# Patient Record
Sex: MALE | Race: OTHER | HISPANIC OR LATINO | Employment: FULL TIME | URBAN - METROPOLITAN AREA
[De-identification: names, ages, dates, MRNs, and addresses within clinical notes are randomized per-mention and may not be internally consistent; named-entity substitution may affect disease eponyms.]

---

## 2017-01-20 ENCOUNTER — GENERIC CONVERSION - ENCOUNTER (OUTPATIENT)
Dept: OTHER | Facility: OTHER | Age: 32
End: 2017-01-20

## 2018-01-12 NOTE — PROGRESS NOTES
Assessment    1  Obesity (278 00) (E66 9)   2  Acanthosis nigricans (701 2) (L83)   3  Encounter for preventive health examination (V70 0) (Z00 00)    Plan  Acanthosis nigricans, Obesity    · (1) COMPREHENSIVE METABOLIC PANEL; Status:Active; Requested for:05Tob2735;    · (1) LIPID PANEL, FASTING; Status:Active; Requested for:34Nwc7067;    · (1) TSH WITH FT4 REFLEX; Status:Active; Requested for:87Kjd0378;    · (1) VITAMIN D 25-HYDROXY; Status:Active; Requested for:01Cle1994;   Obesity    · Begin a limited exercise program ; Status:Complete;   Done: 41ZFB2632   · Begin or continue regular aerobic exercise  Gradually work up to at least 3 sessions of 30  minutes of exercise a week ; Status:Complete;   Done: 53EPE3644   · Diets that are low in carbohydrates and high in protein are very popular for weight loss ;  Status:Complete;   Done: 18WWJ4367   · Drink at least 6 glasses of clear liquids a day ; Status:Complete;   Done: 59REV3452   · Eat a low fat and low cholesterol diet ; Status:Complete;   Done: 00NAD2774   · Keep a diary of when and what you eat ; Status:Complete;   Done: 22XSF9152   · Some eating tips that can help you lose weight ; Status:Complete;   Done: 18WTO4636   · Stretch and warm up your muscles during the first 10 minutes , then cool down your  muscles for the last 10 minutes of exercise ; Status:Complete;   Done: 38DHI3667   · We encourage all of our patients to exercise regularly  30 minutes of exercise or physical  activity five or more days a week is recommended for children and adults ;  Status:Complete;   Done: 79RPH4150   · We recommend that you change your eating habits slowly ; Status:Complete;   Done:  07HNX3778   · We recommend that you follow the "Mediterranean diet "; Status:Complete;   Done:  30XRK4964   · We recommend you modify your diet to achieve and maintain a healthy weight    Being  overweight may increase your risk for developing health problems such as diabetes,  heart disease, and cancer  Avoid high fat foods and eat a balanced diet rich  in fruits and vegetables  The combination of a reduced-calorie diet and increased  physical activity is recommended  Please let us know if you would like to  learn more about your nutrition and calorie needs, and additional options including  weight loss programs that can help you achieve your goals ; Status:Complete;   Done:  41ZZR2964   · Follow-up visit in 1 month Evaluation and Treatment  Follow-up  Status: Hold For -  Scheduling  Requested for: 68XLP9809    Discussion/Summary  Impression: health maintenance visit  Currently, he eats a poor diet and has an inadequate exercise regimen  Prostate cancer screening: PSA is not indicated  Testicular cancer screening: the risks and benefits of testicular cancer screening were discussed, self testicular exam technique was taught and monthly self testicular exam was advised  Colorectal cancer screening: colorectal cancer screening is not indicated  Screening lab work includes glucose, lipid profile and 25-hydroxyvitamin D  The risks and benefits of immunizations were discussed and needs PPD/fluzone  He was advised to be evaluated by an optometrist and a dentist  Advice and education were given regarding nutrition, aerobic exercise, weight bearing exercise, weight loss, calcium supplements, vitamin D supplements, reproductive health, cardiovascular risk reduction, sunscreen use and seat belt use  Patient discussion: discussed with the patient  Obesity and sedentary lifestyle; drinks half a gallon of 2% milk a day  Workup for diabetes/ metabolic syndrome necessary as patient has skin changes acanthosis nigricans; and history of athletes feet  He will return in one month after labs done  We will discuss sleep apnea workup as well as lifestyle management of obesity/ metabolic syndrome  Urged patient to get up and walk around every 30 minutes at work    Urged patient to decrease milk intake to 1 glass a day with 1%  Obtain labs and return to me in one month  Declining Fluzone but may re-consider encourage patient to get flu shot in December 2014 chest xray reviewed w/ pt as per mother's request - no CVA tenderness/ needs further radiologic testing  address at next visit  PPD needs to be read in 48-72 hours  The patient has the current Goals: 5 lb weight loss in one month  exercise 30 mins 3 x a week  get up and  every 30 mins at work  The patent has the current Barriers:   Patient is able to Self-Care  Patient agrees and allows to involve family/caregiver in development of care plan:   Possible side effects of new medications were reviewed with the patient/guardian today  The treatment plan was reviewed with the patient/guardian  The patient/guardian understands and agrees with the treatment plan   The patient was counseled regarding diagnostic results, instructions for management, risk factor reductions, prognosis, patient and family education, impressions, risks and benefits of treatment options, importance of compliance with treatment  Self Referrals: No      Chief Complaint  pt here for a health maintenance visit  no issues  pt needs physical for work completed   depression scale negative  pt declined fllu vaccine today      History of Present Illness  HM, Adult Male: The patient is being seen for a health maintenance evaluation  Social History: Household members include domestic partner  He is unmarried  Work status: working full time and occupation: works w/ disabled  General Health: The patient's health since the last visit is described as good  He does not have regular dental visits  He denies vision problems  He denies hearing loss  Immunizations status: not up to date  Lifestyle:  He does not have a healthy diet  He has weight concerns  He does not exercise regularly  He does not use tobacco  He consumes alcohol  He reports never drinking alcohol  He denies drug use  Reproductive health:  the patient is sexually active  Screening: cancer screening reviewed and updated  metabolic screening reviewed and current  HPI: Here for health maintence - no recent labs  Needs work form filled out  Need PPD done  12 point ROS done - no complaints except snoring and gasping of air at night - better since sleeps reclined  + weight gain x 9 years - used to be 180 lbs  + sports when he was in high school   Met his sign other - had 3 children - started gaining weight  Drinks 1/2 gallon of 2% milk a day  no regular exercise  At present job, role is sedentary  Mom states had abn xray in past - pt states was told not to worrry about it - chest xray reading - upper abdomen calcifications - no further work up  no follow through w/ labs  no follow through w/ sleep study  Review of Systems    Constitutional: No fever or chills, feels well, no tiredness, no recent weight gain or weight loss  Eyes: No complaints of eye pain, no red eyes, no discharge from eyes, no itchy eyes  ENT: no complaints of earache, no hearing loss, no nosebleeds, no nasal discharge, no sore throat, no hoarseness  Cardiovascular: No complaints of slow heart rate, no fast heart rate, no chest pain, no palpitations, no leg claudication, no lower extremity  Respiratory: snore, but No complaints of shortness of breath, no wheezing, no cough, no SOB on exertion, no orthopnea or PND and as noted in HPI  Gastrointestinal: No complaints of abdominal pain, no constipation, no nausea or vomiting, no diarrhea or bloody stools  Genitourinary: No complaints of dysuria, no incontinence, no hesitancy, no nocturia, no genital lesion, no testicular pain  Musculoskeletal: No complaints of arthralgia, no myalgias, no joint swelling or stiffness, no limb pain or swelling  Integumentary: No complaints of skin rash or skin lesions, no itching, no skin wound, no dry skin     Neurological: No compliants of headache, no confusion, no convulsions, no numbness or tingling, no dizziness or fainting, no limb weakness, no difficulty walking  Psychiatric: Is not suicidal, no sleep disturbances, no anxiety or depression, no change in personality, no emotional problems  Endocrine: No complaints of proptosis, no hot flashes, no muscle weakness, no erectile dysfunction, no deepening of the voice, no feelings of weakness  Hematologic/Lymphatic: No complaints of swollen glands, no swollen glands in the neck, does not bleed easily, no easy bruising  Active Problems    1  Abnormal finding on chest xray (793 2) (R93 8)   2  Acanthosis nigricans (701 2) (L83)   3  Acute bronchitis (466 0) (J20 9)   4  Cough (786 2) (R05)   5  Fatigue (780 79) (R53 83)   6  Obesity (278 00) (E66 9)   7  Obstructive sleep apnea (327 23) (G47 33)   8  Pain in joint of right shoulder region (719 41) (M25 511)   9  Somatic dysfunction of cervical region (739 1) (M99 01)   10  Somatic Dysfunction Of Upper Extremities (739 7)   11  Tinea cruris (110 3) (B35 6)   12   Urinary frequency (788 41) (R35 0)    Past Medical History    · History of backache (V13 59) (Z87 39)   · History of headache (V13 89) (Z87 898)    Surgical History    · History of Prior Surgical Procedure Not Done   · Denied: History of Recent Surgery    Family History  Mother    · Family history of asthma (V17 5) (Z82 5)   · Family history of hypertension (V17 49) (Z82 49)   · Family history of thyroid disease (V18 19) (Z80 46)  Father    · Family history of hyperlipidemia (V18 19) (Z83 49)   · Family history of hypertension (V17 49) (Z82 49)  Maternal Grandmother    · Family history of Type 2 Diabetes Mellitus  Grandfather    · Family history of cerebrovascular accident (CVA) (V17 1) (Z82 3)    Social History    · Always uses seat belt   · Does not drink alcohol (V49 89) (Z78 9)   · Drug Use (305 90)   · marijuana   · Full-time employment   · Never A Smoker   · No caffeine use   · No illicit drug use   · Non-smoker (V49 89) (Z78 9)   · Adventist    Current Meds   1  No Reported Medications Recorded    Allergies    1  No Known Drug Allergies    Vitals   Recorded: [id number] 02:08PM   Temperature 98 1 F   Heart Rate 98   Systolic 112   Diastolic 80   Height 5 ft 11 in   Weight 306 lb 6 08 oz   BMI Calculated 42 73   BSA Calculated 2 52   O2 Saturation 98   Pain Scale 0     Physical Exam    Constitutional   General appearance: No acute distress, well appearing and well nourished  Eyes   Conjunctiva and lids: No erythema, swelling or discharge  Pupils and irises: Equal, round, reactive to light  Ophthalmoscopic examination: Normal fundi and optic discs  Ears, Nose, Mouth, and Throat   External inspection of ears and nose: Normal     Otoscopic examination: Tympanic membranes translucent with normal light reflex  Canals patent without erythema  Hearing: Normal     Nasal mucosa, septum, and turbinates: Normal without edema or erythema  Lips, teeth, and gums: Normal, good dentition  Oropharynx: Normal with no erythema, edema, exudate or lesions  Neck   Neck: Supple, symmetric, trachea midline, no masses  Thyroid: Normal, no thyromegaly  Pulmonary   Respiratory effort: No increased work of breathing or signs of respiratory distress  Percussion of chest: Normal     Palpation of chest: Normal     Auscultation of lungs: Clear to auscultation  Cardiovascular   Palpation of heart: Normal PMI, no thrills  Auscultation of heart: Normal rate and rhythm, normal S1 and S2, no murmurs  Examination of extremities for edema and/or varicosities: Normal     Abdomen   Abdomen: Non-tender, no masses  Liver and spleen: No hepatomegaly or splenomegaly  Lymphatic   Palpation of lymph nodes in neck: No lymphadenopathy  Palpation of lymph nodes in axillae: No lymphadenopathy      Musculoskeletal   Gait and station: Normal     Inspection/palpation of digits and nails: Normal without clubbing or cyanosis  Stability: Normal     Muscle strength/tone: Normal     Skin   Skin and subcutaneous tissue: Normal without rashes or lesions  acanthosis nigrans of neck  Palpation of skin and subcutaneous tissue: Normal turgor  Neurologic   Cranial nerves: Cranial nerves 2-12 intact  Reflexes: 2+ and symmetric  Sensation: No sensory loss  Psychiatric   Judgment and insight: Normal     Orientation to person, place and time: Normal     Recent and remote memory: Intact  Mood and affect: Normal        Results/Data  PHQ-2 Adult Depression Screening [id number] 02:11PM User, s     Test Name Result Flag Reference   PHQ-2 Adult Depression Score 0     Over the last two weeks, how often have you been bothered by any of the following problems? Little interest or pleasure in doing things: Not at all - 0  Feeling down, depressed, or hopeless: Not at all - 0   PHQ-2 Adult Depression Screening Negative       * XR Ribs w/ PA Chest (unilateral) [id number] 03:44PM Alejandro Sender     Test Name Result Flag Reference   XR Ribs w/ PA Chest (Report)     [facility];[address] [facility];;[employer] [place];[address]  05/08/2014 1548  05/08/2014 1551  6 VIEWS    LEFT RIBS AND CHEST    INDICATION- Left rib pain  COMPARISON- June 11, 2012    VIEWS- Frontal chest and 3 views left hemithorax& 4 images^ 4 images    FINDINGS-    The cardiomediastinal silhouette is unremarkable  Lungs are clear  No pleural effusions  There is no pneumothorax  No rib fractures are identified  Calcifications in the left upper abdomen of unknown etiology, possibly  calcified nodes? IMPRESSION-    1  No active pulmonary disease  2  No evidence of rib fractures  3  Calcifications in the left upper abdomen of unknown etiology,  possibly calcified nodes? Followup computed tomography recommended if  there is left flank pain        Transcribed on- [id number]    [address], RAD DO  Reading Radiologist- MARTA Jones DO  Releasing Radiologist- MARTA Jones DO  Released Date Time- 05/08/14 1647  ------------------------------------------------------------------------------  9724^COLTON WALSH  9724^COLTON WALSH       Attending Note  Collaborating Physician Note: Collaborating Physician: I agree with the Advanced Practitioner note        Future Appointments    Date/Time Provider Specialty Site   01/20/2017 01:00 PM Janeth Rebolledov 55   12/21/2016 03:00 PM Marley Fulton, Nurse Schedule  [facility]     Signatures   Electronically signed by : Matilda Velasquez; Dec 19 2016  3:08PM EST                       (Author)    Electronically signed by : GOKUL Zhang ; Dec 19 2016  3:32PM EST                       (Author)

## 2018-01-16 NOTE — PROGRESS NOTES
Chief Complaint  Patient here for PPD read  Negative      Active Problems    1  Abnormal finding on chest xray (793 2) (R93 8)   2  Acanthosis nigricans (701 2) (L83)   3  Acute bronchitis (466 0) (J20 9)   4  Cough (786 2) (R05)   5  Fatigue (780 79) (R53 83)   6  Influenza vaccine needed (V04 81) (Z23)   7  Obesity (278 00) (E66 9)   8  Obstructive sleep apnea (327 23) (G47 33)   9  Pain in joint of right shoulder region (719 41) (M25 511)   10  PPD screening test (V74 1) (Z11 1)   11  Somatic dysfunction of cervical region (739 1) (M99 01)   12  Somatic Dysfunction Of Upper Extremities (739 7)   13  Tinea cruris (110 3) (B35 6)   14  Urinary frequency (788 41) (R35 0)    Current Meds   1  No Reported Medications Recorded    Allergies    1  No Known Drug Allergies    Assessment    1   PPD screening test (V74 1) (Z11 1)    Future Appointments    Date/Time Provider Specialty Site   01/20/2017 01:00 PM [address] [facility]     Signatures   Electronically signed by : GOKUL Toussaint ; Dec 22 2016  8:49AM EST                       (Author)

## 2018-01-16 NOTE — MISCELLANEOUS
Signatures   Electronically signed by : Roxy Basurto; Jan 20 2017  1:43PM EST                       (Author)

## 2018-05-13 ENCOUNTER — HOSPITAL ENCOUNTER (EMERGENCY)
Facility: HOSPITAL | Age: 33
Discharge: HOME | End: 2018-05-13
Attending: EMERGENCY MEDICINE
Payer: COMMERCIAL

## 2018-05-13 ENCOUNTER — APPOINTMENT (EMERGENCY)
Dept: RADIOLOGY | Facility: HOSPITAL | Age: 33
End: 2018-05-13
Payer: COMMERCIAL

## 2018-05-13 VITALS
SYSTOLIC BLOOD PRESSURE: 163 MMHG | BODY MASS INDEX: 41.84 KG/M2 | WEIGHT: 300 LBS | RESPIRATION RATE: 20 BRPM | TEMPERATURE: 98.9 F | OXYGEN SATURATION: 99 % | HEART RATE: 81 BPM | DIASTOLIC BLOOD PRESSURE: 96 MMHG

## 2018-05-13 DIAGNOSIS — S92.353A FRACTURE OF 5TH METATARSAL: Primary | ICD-10-CM

## 2018-05-13 PROCEDURE — 99283 EMERGENCY DEPT VISIT LOW MDM: CPT

## 2018-05-13 PROCEDURE — 73630 X-RAY EXAM OF FOOT: CPT

## 2018-05-13 RX ORDER — OXYCODONE HYDROCHLORIDE AND ACETAMINOPHEN 5; 325 MG/1; MG/1
1 TABLET ORAL EVERY 4 HOURS PRN
Qty: 10 TABLET | Refills: 0 | Status: SHIPPED | OUTPATIENT
Start: 2018-05-13 | End: 2018-05-20

## 2018-05-14 NOTE — ED ATTENDING ATTESTATION
Zeb Samuel MD, saw and evaluated the patient  I have discussed the patient with the resident/non-physician practitioner and agree with the resident's/non-physician practitioner's findings, Plan of Care, and MDM as documented in the resident's/non-physician practitioner's note, except where noted  All available labs and Radiology studies were reviewed  At this point I agree with the current assessment done in the Emergency Department  I have conducted an independent evaluation of this patient including a focused history and a physical exam       60-year-old male presenting to the emergency department for evaluation of right lateral foot pain after an inversion injury  On examination the patient is tender over the 5th metatarsal and has moderate swelling over the 5th metatarsal   Distal neurovascular functions intact  X-ray demonstrates Cui fracture  Plan is splint and crutches with Orthopedics follow-up

## 2018-05-14 NOTE — DISCHARGE INSTRUCTIONS
Fractura de pie en adultos   LO QUE NECESITA SABER:   ¿Qué es osvaldo fractura de pie? Se produce osvaldo fractura en el pie cuando toby o más de los huesos del pie se [place]  Las fracturas de pie por lo general son causadas por traumas, caídas, lesiones por fatiga de osvaldo acción repetitiva  ¿Cuáles son los diferentes tipos de fracturas del pie? · Sin desplazamiento:  El hueso se agrieta o se quiebra daniele permanece en elizabeth lugar  · Desplazamiento:  El hueso se quiebra en 2 partes  · Conminuta:  El hueso se quiebra en Jyl Maged  · Fractura abierta:  El hueso fracturado se sale fuera de la piel  ¿Cuáles son los signos y síntomas de osvaldo fractura de pie? · Sensibilidad en el área lesionada    · Dolor en el pie que empeora cuando intenta pararse o caminar    · Adormecimiento en el pie o los dedos del pie    · Se escucha un crujido cuando mueve el pie    · Inflamación, moretones, ampollas o heridas abiertas en la piel del pie lesionado    · Disminución en la capacidad de  el pie o de caminar    · Cambio en la forma del pie  ¿Cómo se diagnostica osvaldo fractura en el pie? Elizabeth médico le examinará el pie  Es posible que le toque el pie para comprobar si urias perdido la sensación  Además buscará lesiones abiertas en la piel  Podría comprobar si puede  el pie  Es posible que usted necesite hacerse alguno de los siguientes estudios:  · Radiografía:  Se trata de osvaldo imagen que se joann del pie para comprobar si algún hueso está fracturado  Es posible que deba cargar peso en elizabeth pie lesionado para diane la radiografía  · Tomografía computarizada:  Adrianne examen también se conoce guanakito escán TAC  Janette Listen de rosamaria x [place] osvaldo computadora para diane imágenes de elizabeth pie  Estas imágenes pueden mostrar huesos rotos u otras lesiones al pie  Es posible que le administren un tinte de contraste antes de diane las imágenes para que los médicos las puedan tyree con más claridad   Dígale al médico si usted alguna vez ha tenido osvaldo reacción alérgica al tinte de [place]  · Imágenes por resonancia magnética (IRM):  Para realizar arcadio estudio se utilizan imanes potentes y Bernabe computadora que joann imágenes de elizabeth pie  Las imágenes podrían mostrar si tiene osvaldo fractura u otra lesión en el pie  Le podrían administrar un tinte para ayudar a que las imágenes se vean mejor  Dígale al médico si usted alguna vez ha tenido osvaldo reacción alérgica al tinte de [place]  No entre a la fred donde se realiza la resonancia magnética con algo de metal  El metal puede causar lesiones serias  Dígale al médico si usted tiene algo de metal por dentro o sobre elizabeth cuerpo  · Escán óseo:  Se administra osvaldo pequeña y griffiths cantidad de un medio de contraste radioactivo por vía intravenosa  Se santos imágenes de los huesos del pie para tyree si hay alguna fractura  ¿Cómo se trata osvaldo fractura del pie? El tratamiento va a depender del tipo de fractura que usted tenga, y elizabeth severidad  Es posible que usted necesite alguno de los siguientes:  · [place], [place] o férula:  Es posible que le pongan osvaldo bota, un yeso o osvaldo férula para limitar el movimiento de la parte inferior de la pierna y el pie  Estos dispositivos mantienen los [employer] elizabeth lugar, alivian el dolor y evitan que el pie se dañe [facility]  · Medicamento:      ¨ Analgésicos:  Es posible que le receten un medicamento para aliviar el dolor  No espere hasta que el dolor sea severo antes de diane arcadio medicamento  ¨ Antibióticos:  Arcadio medicamento se administra para ayudar a tratar o prevenir osvaldo infección causada por bacteria  ¨ Donata Kanner antitetánica:  Jacelyn Fill se aplica para prevenir que contraiga el tétano  Es posible que necesite recibirla si se le abrió la piel guanakito resultado de la lesión  Debe darse la vacuna antitetánica si no se la ha dado en los últimos 5 a 10 años  · Cirugía:  Podría necesitar osvaldo cirugía si la fractura de elizabeth pie es grave o no se waylon con otros tratamientos   Si usted tiene Onita Brooms, podría necesitar desbridamiento antes de elizabeth cirugía  El desbridamiento es cuando elizabeth médico remueve tejido dañado e infectado, y limpia la herida  Se hace para evitar que contraiga osvaldo infección y que sane mejor  Es posible que usted necesite alguno de los siguientes:    ¨ Fijación externa:  Elizabeth médico colocará tornillos en los huesos fracturados a través de la piel  Los tornillos se fijan a un dispositivo que se encuentra fuera del pie  La fijación externa mantiene los [place], de modo que se puedan sanar  ¨ Reducción abierta y fijación interna:  Hung aziland, elizabeth médico le hará osvaldo incisión en el pie para enderezar los huesos fracturados  Es posible que use alambres, tornillos y placas de metal o clavos para mantener unidas las partes de los huesos fracturados  ¨ Fijación con clavo:  Es posible que elizabeth médico deba usar clavos de alambre de metal para enderezar los huesos fracturados del pie  Los [employer] unidas las partes del hueso fracturado  Colocarán los clavos a través de la piel y usarán un pequeño taladro para ponerlos en el hueso  · Tracción:  La tracción racheal los huesos para colocarlos nuevamente en elizabeth lugar  Se podría poner un clavo en el hueso o en el yeso y sujetarlo al dispositivo de tracción  Se cuelgan pesas del dispositivo de tracción para ayudar a poner los [place]'s posición correcta  ¿Cuáles son los riesgos de osvaldo fractura del pie? · Los nervios, tejidos y vasos sanguíneos de elizabeth pie se podrían dañar hung la [place]  Podría tener entumecimiento o debilidad en el pie y los dedos del pie  Es posible que elizabeth pie no sane rosey o que no quede igual que antes de la lesión  Los tornillos o [employer] se usaron hung la cirugía se podrían soltar y podría ser necesario hacer otra cirugía  Usted podría contraer osvaldo infección  Se le podría formar un coágulo sanguíneo en la pierna   El coágulo podría desprenderse y viajar a elizabeth corazón o cerebro y crear problemas de peligro mortal, guanakito un ataque al corazón o un derrame cerebral     · Si no recibe tratamiento, puede que la fractura de elizabeth pie no se sane  Si la fractura se waylon sharla, elizabeth pie podría quedar deformado  Es posible que no pueda  el pie tan rosey guanakito antes de la lesión  Podría sentir dolor, debilidad o pérdida de la sensación en el pie  Podría correr [employer] de que se le formen coágulos de [place]  Se le podrían dañar los tejidos y contraer osvaldo infección  Si la infección es grave, se le podría infectar el hueso y podría resultar necesario amputarle el pie  ¿Qué puedo hacer para ayudar a que mi pie se cure? · Descanse:  Es posible que deba descansar el pie y evitar las actividades que le provocan dolor  [address] por estrés, usted tendrá que evitar la actividad que causó la fractura hasta que logre sanar  · Hielo:  El hielo ayuda a disminuir la inflamación y el dolor  El hielo también puede contribuir a evitar el daño de los tejidos  Use osvaldo bolsa con hielo o ponga hielo triturado en osvaldo bolsa de plástico  Sapna Bunkers el hielo con Adelene Christine y colóquelo sobre elizabeth pie hung 15 a 20 minutos cada hora o guanakito le indiquen  · Eleve el pie:  Levante el pie por encima del nivel de elizabeth corazón tan a menudo guanakito pueda  [place] va a disminuir inflamación y el dolor  [place] elizabeth pie sobre almohadas o mantas para mantenerlo elevado cómodamente  · Fisioterapia:  Osvaldo vez que haya sanado, un fisioterapeuta podrá enseñarle ejercicios para fortalecer el pie, aumentar el movimiento y aliviar el dolor  ¿Cuándo evelio comunicarme con mi médico?   · Usted tiene fiebre  · Le salen nuevas llagas alrededor de la bota, el yeso o la [place]  · Le surge dificultad o tiene mayor dificultad para  el pie  · Nota que sale mal olor de debajo del yeso  · Se le daña la bota, el yeso o la férula  · Tiene alguna pregunta acerca de elizabeth condición o cuidado    ¿Cuándo evelio buscar atención inmediata o llamar al 911? · El dolor en deshpande pie lesionado empeora, aún después de descansar y diane el analgésico para el dolor    · La piel o los dedos del pie se le adormecen, están inflamados, fríos, blancos o azules  · Usted tiene más dolor o inflamación que antes de que le pusieran el yeso  · La herida drena líquido o pus  · La jeevan empapa el vendaje  · Deshpande pierna se siente cálida, sensible y [place]  Se podría tyree inflamado y hoff  · De repente se siente mareado y sin aire  · Le duele el pecho cuando respira hondo o tose  Es posible que [employer]  ACUERDOS SOBRE DESHPANDE CUIDADO:   Usted tiene el derecho de ayudar a planear deshpande cuidado  Aprenda todo lo que pueda sobre deshpande condición y guanakito darle tratamiento  Discuta andres opciones de tratamiento con andres médicos para decidir el cuidado que usted desea recibir  Usted siempre tiene el derecho de rechazar el tratamiento  Esta información es sólo para uso en educación  Deshpande intención no es darle un consejo médico sobre enfermedades o tratamientos  Colsulte con deshpande [employer] farmacéutico antes de seguir cualquier régimen médico para saber si es seguro y efectivo para usted  © 2017 [address] Information is for End User's use only and may not be sold, redistributed or otherwise used for commercial purposes  All illustrations and images included in CareNotes® are the copyrighted property of A D A M , Inc  or Kalen Mcgee

## 2018-05-14 NOTE — ED PROVIDER NOTES
History  Chief Complaint   Patient presents with    Foot Injury     right foot pain after stepping down onto foot- heard a pop noise and then felt pain     This is a 51-year-old male that presents today with a foot injury  Patient states he was at his girlfriend's house when he was walking on the porch and had an inversion injury  He states he felt a pop and since then he has been having difficulty with ambulation of his right foot  Pain localized to the mid foot 5th metatarsal area where there is swelling  He states no prior injuries before  Denies any numbness or tingling  No weakness of that foot  No ankle pain  No tib-fib or knee pain  Denies any chest pain shortness of breath abdominal pain  28 year male with a foot injury  Patient does have some swelling of the 5th metatarsal area  Will get x-ray and treat accordingly            None       History reviewed  No pertinent past medical history  History reviewed  No pertinent surgical history  History reviewed  No pertinent family history  I have reviewed and agree with the history as documented  Social History   Substance Use Topics    Smoking status: Never Smoker    Smokeless tobacco: Never Used    Alcohol use No        Review of Systems   Constitutional: Negative  Negative for diaphoresis and fever  HENT: Negative  Respiratory: Negative  Negative for cough, shortness of breath and wheezing  Cardiovascular: Negative  Negative for chest pain, palpitations and leg swelling  Gastrointestinal: Negative for abdominal distention, abdominal pain, nausea and vomiting  Genitourinary: Negative  Musculoskeletal: Negative  Foot pain   Skin: Negative  Neurological: Negative  Psychiatric/Behavioral: Negative  All other systems reviewed and are negative        Physical Exam  ED Triage Vitals [05/13/18 2119]   Temperature Pulse Respirations Blood Pressure SpO2   98 9 °F (37 2 °C) 81 20 163/96 99 %      Temp src Heart Rate Source Patient Position - Orthostatic VS BP Location FiO2 (%)   -- -- -- -- --      Pain Score       Worst Possible Pain           Orthostatic Vital Signs  Vitals:    05/13/18 2119   BP: 163/96   Pulse: 81       Physical Exam   Constitutional: He is oriented to person, place, and time  He appears well-developed and well-nourished  No distress  HENT:   Head: Normocephalic and atraumatic  Nose: Nose normal    Mouth/Throat: Oropharynx is clear and moist    Eyes: Conjunctivae and EOM are normal  Pupils are equal, round, and reactive to light  Neck: Normal range of motion  Neck supple  Cardiovascular: Normal rate, regular rhythm and normal heart sounds  No murmur heard  Pulmonary/Chest: Effort normal and breath sounds normal  No respiratory distress  He has no wheezes  He has no rales  Abdominal: Soft  Bowel sounds are normal  He exhibits no distension  There is no tenderness  There is no rebound and no guarding  Musculoskeletal: Normal range of motion  He exhibits edema and tenderness  He exhibits no deformity  Feet:    Swelling localized to the 5th metatarsal with some tenderness  No bruising  Normal dorsalis pedis pulses  No tenderness to the ankle  No tenderness  Patient able to dorsiflex and plantar flex  Normal range of motion of the ankle   Neurological: He is alert and oriented to person, place, and time  No cranial nerve deficit  Skin: Skin is warm and dry  No rash noted  He is not diaphoretic  No pallor  Psychiatric: He has a normal mood and affect  Vitals reviewed  ED Medications  Medications - No data to display    Diagnostic Studies  Results Reviewed     None                 XR foot 3+ views RIGHT   ED Interpretation by Heath Ng MD (05/13 2143)   Fracture of 5th metatarsal            Procedures  Orthopedic Injury  Date/Time: 5/13/2018 10:03 PM  Performed by: Andie Kingsley  Authorized by: Virgil Merino   Consent: Verbal consent obtained    Consent given by: patient  Patient identity confirmed: verbally with patient  Injury location: foot  Location details: right foot  Injury type: fracture  Fracture type: fifth metatarsal  Pre-procedure neurovascular assessment: neurovascularly intact  Pre-procedure distal perfusion: normal  Pre-procedure neurological function: normal  Pre-procedure range of motion: normal  Manipulation performed: no  Immobilization: splint  Splint type: short leg  Supplies used: Ortho-Glass  Post-procedure neurovascular assessment: post-procedure neurovascularly intact  Post-procedure distal perfusion: normal  Post-procedure neurological function: normal  Post-procedure range of motion: normal  Patient tolerance: Patient tolerated the procedure well with no immediate complications            Phone Consults  ED Phone Contact    ED Course                               MDM  Number of Diagnoses or Management Options  right Fracture of 5th metatarsal:   Diagnosis management comments: Patient has a 5th metatarsal injury  Splinted advised to follow up with Orthopedics  Will prescribe Percocet  I did discuss with patient regarding when taking Percocet do not operate any heavy machinery  Advised to return if any worsening swelling worsening pain  CritCare Time    Disposition  Final diagnoses:   right Fracture of 5th metatarsal     Time reflects when diagnosis was documented in both MDM as applicable and the Disposition within this note     Time User Action Codes Description Comment    5/13/2018  9:57 PM Yael, [address] Fracture of 5th metatarsal     5/13/2018  9:57 PM Yael, [address] right Fracture of 5th metatarsal       ED Disposition     ED Disposition Condition Comment    Discharge  [facility] discharge to home/self care      Condition at discharge: Good        Follow-up Information     Follow up With Specialties Details Why Contact Lana Vance MD Orthopedic Surgery Schedule an appointment as soon as possible for a visit  [address] [place] [address]  [phone]          Discharge Medication List as of 5/13/2018  9:59 PM      START taking these medications    Details   oxyCODONE-acetaminophen (PERCOCET) 5-325 mg per tablet Take 1 tablet by mouth every 4 (four) hours as needed for moderate pain for up to 10 days Max Daily Amount: 6 tablets, Starting Sun 5/13/2018, Until Wed 5/23/2018, Print           No discharge procedures on file  ED Provider  Attending physically available and evaluated Seven [place] I managed the patient along with the ED Attending      Electronically Signed by         Jerman Wall MD  05/13/18 9304

## 2018-05-16 ENCOUNTER — OFFICE VISIT (OUTPATIENT)
Dept: OBGYN CLINIC | Facility: OTHER | Age: 33
End: 2018-05-16
Payer: COMMERCIAL

## 2018-05-16 VITALS
BODY MASS INDEX: 40.63 KG/M2 | SYSTOLIC BLOOD PRESSURE: 148 MMHG | HEART RATE: 83 BPM | DIASTOLIC BLOOD PRESSURE: 94 MMHG | HEIGHT: 72 IN | WEIGHT: 300 LBS

## 2018-05-16 DIAGNOSIS — M79.661 PAIN OF RIGHT CALF: ICD-10-CM

## 2018-05-16 DIAGNOSIS — S92.354A CLOSED NONDISPLACED FRACTURE OF FIFTH METATARSAL BONE OF RIGHT FOOT, INITIAL ENCOUNTER: Primary | ICD-10-CM

## 2018-05-16 PROCEDURE — 99203 OFFICE O/P NEW LOW 30 MIN: CPT | Performed by: INTERNAL MEDICINE

## 2018-05-16 NOTE — PROGRESS NOTES
Assessment/Plan:  Assessment/Plan   Diagnoses and all orders for this visit:    Closed nondisplaced fracture of fifth metatarsal bone of right foot, initial encounter  -     D-dimer, quantitative; Future    Pain of right calf  -     D-dimer, quantitative; Future      Patient sustained a right 5th metatarsal base fracture (Cui fracture)  Although patient's cuff examination is not highly suspicious for DVT, differential diagnosis for developing some acute calf discomfort yesterday which reportedly was in there since his injury includes DVT and callus strain  I have ordered serum D-dimer to rule out possible DVT  Subjective:   Patient ID: Klaus Presley is a 28 y o  male  HPI    Fab Gan is a pleasant 66-year-old male presents for initial evaluation of an acute right foot injury which he sustained status post trip and fall in his backyard on Sunday 5/13/2018  He felt a pop and acute pain  He subsequently went to the ED on the same day where he was acutely treated  He was placed in a sugar-tong splint and referred to our service for further evaluation and management  On today's presentation, he is using bilateral crutches for ambulation  He reports feeling some mild pain and tightness in the back of his right calf  This symptom started yesterday  The following portions of the patient's history were reviewed and updated as appropriate: allergies, current medications, past family history, past medical history, past social history, past surgical history and problem list     Review of Systems  Review of Systems   Constitutional: Negative  HENT: Negative  Eyes: Negative  Respiratory: Negative  Cardiovascular: Negative  Musculoskeletal:        As per history of present illness   Skin: Negative  Neurological:   Negative  Psychiatric/Behavioral: Negative          Objective:  Vitals:    05/16/18 1505   BP: 148/94   BP Location: Left arm   Patient Position: Sitting   Cuff Size: Large   Pulse: 83   Weight: 136 kg (300 lb)   Height: 6' (1 829 m)       Right Ankle Exam   Right ankle exam is normal     Tenderness   Right ankle tenderness location: Mild distal calf tenderness without associated swelling  Tenderness     Right Ankle/Foot   Tenderness in the fifth metatarsal base  Physical Exam  Constitutional: Oriented to person, place, and time  Well-developed and well-nourished  HENT:   Head: Normocephalic and atraumatic  Eyes: Conjunctivae are normal    Cardiovascular: Normal rate  Pulmonary/Chest: Effort normal    Neurological: Alert and oriented to person, place, and time  Skin: Skin is warm and dry  Psychiatric: Normal mood and affect  I have personally reviewed pertinent films in PACS and my interpretation is Right foot x-ray done on 5/13/2018 is significant for a 5th  metatarsal base fracture (Cui fracture)  Catarina Agarwal

## 2018-05-16 NOTE — LETTER
May 16, 2018     Patient: Bernardino Roberts   YOB: 1985   Date of Visit: 5/16/2018       To Whom it May Concern:    Bernardino Roberts is under my professional care  He was seen in my office on 5/16/2018  He may  Return to work on 5/21/2018  If you have any questions or concerns, please don't hesitate to call           Sincerely,          Steven Stallings MD        CC: No Recipients

## 2018-05-18 ENCOUNTER — OFFICE VISIT (OUTPATIENT)
Dept: OBGYN CLINIC | Facility: OTHER | Age: 33
End: 2018-05-18
Payer: COMMERCIAL

## 2018-05-18 VITALS
BODY MASS INDEX: 40.63 KG/M2 | SYSTOLIC BLOOD PRESSURE: 146 MMHG | DIASTOLIC BLOOD PRESSURE: 91 MMHG | HEIGHT: 72 IN | WEIGHT: 300 LBS | HEART RATE: 91 BPM

## 2018-05-18 DIAGNOSIS — S92.354A CLOSED NONDISPLACED FRACTURE OF FIFTH METATARSAL BONE OF RIGHT FOOT, INITIAL ENCOUNTER: Primary | ICD-10-CM

## 2018-05-18 DIAGNOSIS — M79.671 PAIN IN RIGHT FOOT: ICD-10-CM

## 2018-05-18 PROCEDURE — 99213 OFFICE O/P EST LOW 20 MIN: CPT | Performed by: INTERNAL MEDICINE

## 2018-05-18 PROCEDURE — 29405 APPL SHORT LEG CAST: CPT | Performed by: INTERNAL MEDICINE

## 2018-05-18 NOTE — PROGRESS NOTES
Assessment/Plan:  Assessment/Plan   Diagnoses and all orders for this visit:    Closed nondisplaced fracture of fifth metatarsal bone of right foot, initial encounter    Encounter for Cast check     - Short leg cast    Pain in right foot    New fiberglass short short-leg cast was applied with stockinette and Webril padding  Cast considerations and care instructions were provided at the time of application  Brisk capillary refill noted in all toes  Sensation and motor function intact  Patient will be seen for follow-up on previously scheduled date of 6/14/2018  He is advised to contact the office if any questions considerations or concerns should arise between now and his next scheduled appointment  Subjective:   Patient ID: Alivia Dick is a 28 y o  male  Vania Somers is a pleasant 20-year-old male who presents today for cast replacement  Cast was originally applied on 5/16/2018 for stabilization of right foot 5th metatarsal fracture  He reports that while showering yesterday evening, he accidentally got water into the cast   This morning he reports that he still felt moisture against his heel and along the plantar aspect of his foot, and has contacted the office in accordance with previous instructions to have a cast check and replaced if necessary  The following portions of the patient's history were reviewed and updated as appropriate: allergies, current medications and problem list     Review of Systems   Constitutional: Negative for chills, fever and unexpected weight change  HENT: Negative for hearing loss, nosebleeds and sore throat  Eyes: Negative for pain, redness and visual disturbance  Respiratory: Negative for cough, shortness of breath and wheezing  Cardiovascular: Negative for chest pain, palpitations and leg swelling  Gastrointestinal: Negative for abdominal pain, nausea and vomiting  Endocrine: Negative for polydipsia and polyuria     Genitourinary: Negative for dysuria and hematuria  Musculoskeletal:        As noted in HPI/PE   Skin: Negative for rash and wound  Neurological: Negative for dizziness, numbness and headaches  Psychiatric/Behavioral: Negative for decreased concentration and suicidal ideas  The patient is not nervous/anxious  Objective:    Vitals:    05/18/18 1000   BP: 146/91   Patient Position: Sitting   Pulse: 91   Weight: 136 kg (300 lb)   Height: 6' (1 829 m)       Ortho Exam     Patient presents with cast intact  Notable dampness of padding material at both proximal and distal openings  Once cast was removed, there was notable saturation of casting material along the plantar aspect of the heel and foot  Notable callus formation on the plantar aspect of the heel and the ball of foot, however these calluses were present prior to previous cast application  No notable skin breakdown is observed  Brisk capillary refill noted in all toes  Sensation and motor function intact  Physical Exam   Constitutional: He is oriented to person, place, and time  He appears well-developed and well-nourished  HENT:   Right Ear: External ear normal    Left Ear: External ear normal    Nose: Nose normal    Eyes: Conjunctivae and EOM are normal  Pupils are equal, round, and reactive to light  Neck: Normal range of motion  Cardiovascular: Intact distal pulses  Pulmonary/Chest: Effort normal    Musculoskeletal: Normal range of motion  Neurological: He is alert and oriented to person, place, and time  Skin: Skin is warm and dry  Psychiatric: He has a normal mood and affect   His behavior is normal  Judgment and thought content normal        No pertinent imaging reviewed this visit    Scribe Attestation    I,:   Regine Pierre am acting as a scribe while in the presence of the attending physician :        I,:    personally performed the services described in this documentation    as scribed in my presence :

## 2018-05-19 VITALS
OXYGEN SATURATION: 99 % | BODY MASS INDEX: 40.69 KG/M2 | HEART RATE: 88 BPM | WEIGHT: 300 LBS | SYSTOLIC BLOOD PRESSURE: 155 MMHG | RESPIRATION RATE: 18 BRPM | DIASTOLIC BLOOD PRESSURE: 96 MMHG | TEMPERATURE: 98 F

## 2018-05-20 ENCOUNTER — HOSPITAL ENCOUNTER (EMERGENCY)
Facility: HOSPITAL | Age: 33
Discharge: HOME | End: 2018-05-20
Attending: EMERGENCY MEDICINE | Admitting: EMERGENCY MEDICINE
Payer: COMMERCIAL

## 2018-05-20 DIAGNOSIS — Z47.89 CAST DISCOMFORT: Primary | ICD-10-CM

## 2018-05-20 PROCEDURE — 99282 EMERGENCY DEPT VISIT SF MDM: CPT

## 2018-05-20 NOTE — ED ATTENDING ATTESTATION
Davee Osler, MD, saw and evaluated the patient  I have discussed the patient with the resident/non-physician practitioner and agree with the resident's/non-physician practitioner's findings, Plan of Care, and MDM as documented in the resident's/non-physician practitioner's note, except where noted  All available labs and Radiology studies were reviewed  At this point I agree with the current assessment done in the Emergency Department  I have conducted an independent evaluation of this patient including a focused history of:    Emergency Department Note- Carin Dolan 28 y o  male MRN: [id number]    Unit/Bed#: SPR 2 Encounter: [id number]    Carin Dolan is a 28 y o  male who presents with   Chief Complaint   Patient presents with    Cast Problem     right foot cast placed yesterday (after splinting in ED, and first cast that got wet)  presented today because cast feels very tight especially around ankle, which is not where the break is, saw ortho on [address]         History of Present Illness   HPI:  Carin Dolan is a 28 y o  male who presents for evaluation of:    Right ankle pain after placement of a cast yesterday  The patient fractured his 5th metatarsal last week  He had a cast placed  The cast was replaced yesterday because it got wet  The patient noted development of pain today around the right ankle  He would like his cast removed  The cast was placed by [address] [place] [address] who he will be seeing again on Monday  Review of the patient's medical records: He had a nondisplaced fracture of the 5th metatarsal on May 13  Review of Systems    Historical Information   Past Medical History:   Diagnosis Date    Foot injury     Fractures      History reviewed  No pertinent surgical history    Social History   History   Alcohol Use No     History   Drug Use    Types: Marijuana     History   Smoking Status    Never Smoker   Smokeless Tobacco    Never Used     Family History: non-contributory    Meds/Allergies   all medications and allergies reviewed  No Known Allergies    Objective   First Vitals:   Blood Pressure: 155/96 (18)  Pulse: 88 (18)  Temperature: 98 °F (36 7 °C) (18)  Respirations: 18 (18)  Weight - Scale: 136 kg (300 lb) (18)  SpO2: 99 % (18)    Current Vitals:   Blood Pressure: 155/96 (18)  Pulse: 88 (18)  Temperature: 98 °F (36 7 °C) (18)  Respirations: 18 (18)  Weight - Scale: 136 kg (300 lb) (18)  SpO2: 99 % (18)    No intake or output data in the 24 hours ending 18 0047    Invasive Devices          No matching active lines, drains, or airways          Physical Exam   Constitutional: He is oriented to person, place, and time  He appears well-developed and well-nourished  Cardiovascular: Normal rate and regular rhythm  Pulmonary/Chest: Effort normal and breath sounds normal    Abdominal: Soft  Bowel sounds are normal    Musculoskeletal: Normal range of motion  He exhibits no deformity  The patient has a cast on his right foot and right leg  The patient moves his toes normally of his right foot  Perfusion of the toes appears normal    Neurological: He is alert and oriented to person, place, and time  Skin: Skin is warm and dry  Psychiatric: He has a normal mood and affect  His behavior is normal  Judgment and thought content normal    Nursing note and vitals reviewed  Medical Decision Makin  Acute right  Ankle pain secondary to cast placement:  Plan to saw the sides of the cast to bivalve the cast and convert it into a splint  No results found for this or any previous visit (from the past 36 hour(s))  No orders to display         Portions of the record may have been created with voice recognition software   Occasional wrong word or "sound a like" substitutions may have occurred due to the inherent limitations of voice recognition software  Read the chart carefully and recognize, using context, where substitutions have occurred

## 2018-05-20 NOTE — DISCHARGE INSTRUCTIONS
Cuidado del yeso   LO QUE NECESITA SABER:   El cuidado del yeso permitirá que el yeso se seque y endurezca correctamente y así mantenerlo protegido hasta que se lo quiten  Es posible que deba esperar hasta 50 horas para que el yeso se seque y se endurezca por completo  El yeso se puede dañar incluso después de haberse endurecido  INSTRUCCIONES SOBRE EL [facility]:   Regrese a la fred de emergencias si:   · Se le rompe o daña el yeso  · Usted nota osvaldo secreción o elizabeth yeso está manchado o huele mal      · Elizabeth piel se pone robert o pálida  · Elizabeth piel tiene hormigueo, quemazón o se siente fría o adormecida  · Usted tiene un dolor intenso que empeora y no se [place] después de diane camilla medicamentos para el dolor  · Elizabeth extremidad se le inflama o elizabeth yeso parece o se siente más ajustado que antes  Pregúntele a elizabeth Lindsey Byes vitaminas y minerales son adecuados para usted  · Algo se  dentro del yeso y se queda atascado  · Siente comezón, dolor, ardor o debilidad donde tiene el yeso  · Usted tiene fiebre  · Tiene llagas, ampollas o se le cuartea la piel alrededor de las orillas del yeso  · Usted tiene preguntas o inquietudes acerca de elizabeth condición o cuidado  Acuda a camilla consultas de control con elizabeth médico según le indicaron  Va a tener que regresar para que le quiten el yeso y le revisen camilla [place]  Anote camilla preguntas para que se acuerde de hacerlas hung camilla visitas  Cuide el yeso mientras se endurece:   · Julio Escalera yeso  No ponga peso [employer]  No doble, recueste o golpee el yeso con algo  Mueva el yeso con la cosby de la [place]  No use los dedos  Camilla dedos podrían dejar impresiones en el [employer] se seca  · Cambie de posición a menudo  Cambie de posición cada 2 horas para ayudar a que el yeso se seque más rápido  Apóyelo sobre algo blando, guanakito Clara, para evitar que osvaldo parte del yeso se aplane       · Mantenga el yeso seco   Ate osvaldo bolsa plástica de basura alrededor del yeso para que no se moje cuando se da un baño  Si se moja, puede usar un secador de juan carlos a la temperatura más baja para secarlo  No use temperatura rubin para no quemarse la piel  Ciertos tipos de yeso se pueden mojar  Pregunte si le perera puesto un yeso impermeable  Cuide el yeso osvaldo vez que se haya endurecido:   · Fíjese en elizabeth yeso todos los días  Comuníquese con elizabeth médico si nota grietas, abolladuras o agujeros en el yeso, o si se Bernell Craw a deshacer en alguna parte  · Hazle Mailman yeso limpio y [address] yeso con Kori Sara toalla cuando come  Es posible osvaldo Knute Christiano del yeso sea removible para poder controlar las incisiones debajo del yeso  Asegúrese de que el pedazo pequeño de yeso esté herméticamente cerrado  Si se ensucia el yeso, limpie el exterior con un detergente suave y un paño húmedo  Siga cubriendo el yeso con bolsas plásticas de basura para que no se moje cuando joann un baño  · Cuide los bordes del yeso  Cubra los filos del yeso para mantenerlos suaves  Use trozos de cinta impermeable de 4 pulgadas (10 cm) de kaylen  Pegue un extremo de la cinta a la orilla interior del yeso y doble la cinta hacia afuera  Coloque tiras de cinta osvaldo sobre la otra hasta que los filos estén completamente cubiertos  Cambie la cinta robbin guanakito le indiquen  No jale ni repare el material acolchado que se encuentra dentro del yeso  [place] podría sacarle ampollas o llagas en la piel que se encuentra debajo del yeso  · No ponga [employer]  No permita que nadie presione o se incline sobre elizabeth yeso  [place] podría hacer que se rompa  · No use objetos afilados  No use un objeto afilado o en punta para rascarse la piel debajo del yeso  [place] podría causar heridas que se podrían infectar, o se le podría perder el objeto dentro del yeso  Si siente comezón en la piel, sople aire fresco debajo del yeso  También puede usar un paño para rascarse con cuidado la piel fuera del yeso    © 2017 [facility] is for End User's use only and may not be sold, redistributed or otherwise used for commercial purposes  All illustrations and images included in CareNotes® are the copyrighted property of A D A M , Inc  or Kalen Mcgee  Esta información es sólo para uso en educación  Elizabeth intención no es darle un consejo médico sobre enfermedades o tratamientos  Colsulte con elizabeth Bary Donald farmacéutico antes de seguir cualquier régimen médico para saber si es seguro y efectivo para usted

## 2018-06-14 ENCOUNTER — APPOINTMENT (OUTPATIENT)
Dept: RADIOLOGY | Facility: OTHER | Age: 33
End: 2018-06-14
Payer: COMMERCIAL

## 2018-06-14 ENCOUNTER — OFFICE VISIT (OUTPATIENT)
Dept: OBGYN CLINIC | Facility: OTHER | Age: 33
End: 2018-06-14
Payer: COMMERCIAL

## 2018-06-14 DIAGNOSIS — S92.354A CLOSED NONDISPLACED FRACTURE OF FIFTH METATARSAL BONE OF RIGHT FOOT, INITIAL ENCOUNTER: Primary | ICD-10-CM

## 2018-06-14 DIAGNOSIS — S92.354A CLOSED NONDISPLACED FRACTURE OF FIFTH METATARSAL BONE OF RIGHT FOOT, INITIAL ENCOUNTER: ICD-10-CM

## 2018-06-14 PROCEDURE — 73630 X-RAY EXAM OF FOOT: CPT

## 2018-06-14 PROCEDURE — 99213 OFFICE O/P EST LOW 20 MIN: CPT | Performed by: INTERNAL MEDICINE

## 2018-06-14 NOTE — LETTER
June 14, 2018     Patient: Tom Alvarado   YOB: 1985   Date of Visit: 6/14/2018       To Whom it May Concern:    Tom Alvarado is under my professional care  He was seen in my office on 6/14/2018  He may return to work on 6/25/2018  If you have any questions or concerns, please don't hesitate to call           Sincerely,          Henry Monroy MD        CC: No Recipients

## 2018-06-14 NOTE — PROGRESS NOTES
Assessment/Plan:  Assessment/Plan   Diagnoses and all orders for this visit:    Closed nondisplaced fracture of fifth metatarsal bone of right foot, initial encounter  -     XR foot 3+ vw right; Future  -     [facility] my clinical impression is that the has been an interval healing process of patient's right 5th metatarsal Cui fracture P as evidenced by some callus formation  He does not have any palpable tenderness today  I have transitioned him into a postop shoe which he will use for another 2 weeks and then discontinue it, then gradually resume activities as tolerated       Subjective:   Patient ID: Roberto Carlos Coker is a 28 y o  male  HPI  Mr  Samantha Tom presents for follow-up re-evaluation of his right 5th metatarsal Cui fracture  During our previous encounter, we placed him in a short-leg cast   On today's presentation, he reports that his pain has subsided  The following portions of the patient's history were reviewed and updated as appropriate: allergies, current medications, past family history, past medical history, past social history, past surgical history and problem list     Review of Systems  Review of Systems   Constitutional: Negative  HENT: Negative  Eyes: Negative  Respiratory: Negative  Cardiovascular: Negative  Musculoskeletal:        As per history of present illness   Skin: Negative  Neurological:  Negative   Psychiatric/Behavioral: Negative  Objective:  Right Ankle Exam   Right ankle exam is normal   Swelling: none    Range of Motion   The patient has normal right ankle ROM  Muscle Strength   Dorsiflexion:  4/5  Other   Erythema: absent  Sensation: normal  Pulse: present     Comments:  Keratotic skin thickening noted in the bilateral plantar foot  , right greater than left            Strength/Myotome Testing     Right Ankle/Foot   Dorsiflexion: 4      Physical Exam    I have personally reviewed pertinent films in PACS and my interpretation is Repeat right foot x-ray shows interval healing  There is good callus formation  Deysi Posey

## 2020-04-12 ENCOUNTER — HOSPITAL ENCOUNTER (EMERGENCY)
Facility: HOSPITAL | Age: 35
Discharge: HOME | End: 2020-04-12
Attending: EMERGENCY MEDICINE | Admitting: EMERGENCY MEDICINE

## 2020-04-12 VITALS
DIASTOLIC BLOOD PRESSURE: 93 MMHG | BODY MASS INDEX: 37.3 KG/M2 | OXYGEN SATURATION: 97 % | WEIGHT: 275 LBS | HEART RATE: 58 BPM | TEMPERATURE: 99 F | RESPIRATION RATE: 20 BRPM | SYSTOLIC BLOOD PRESSURE: 171 MMHG

## 2020-04-12 DIAGNOSIS — E87.6 HYPOKALEMIA: ICD-10-CM

## 2020-04-12 DIAGNOSIS — R03.0 ELEVATED BLOOD-PRESSURE READING WITHOUT DIAGNOSIS OF HYPERTENSION: ICD-10-CM

## 2020-04-12 DIAGNOSIS — R11.2 NON-INTRACTABLE VOMITING WITH NAUSEA, UNSPECIFIED VOMITING TYPE: Primary | ICD-10-CM

## 2020-04-12 LAB
ALBUMIN SERPL BCP-MCNC: 4.3 G/DL (ref 3.5–5)
ALP SERPL-CCNC: 76 U/L (ref 46–116)
ALT SERPL W P-5'-P-CCNC: 27 U/L (ref 12–78)
ANION GAP SERPL CALCULATED.3IONS-SCNC: 4 MMOL/L (ref 4–13)
AST SERPL W P-5'-P-CCNC: 13 U/L (ref 5–45)
BASOPHILS # BLD AUTO: 0.02 THOUSANDS/ΜL (ref 0–0.1)
BASOPHILS NFR BLD AUTO: 0 % (ref 0–1)
BILIRUB SERPL-MCNC: 0.96 MG/DL (ref 0.2–1)
BUN SERPL-MCNC: 14 MG/DL (ref 5–25)
CALCIUM SERPL-MCNC: 9.2 MG/DL (ref 8.3–10.1)
CHLORIDE SERPL-SCNC: 100 MMOL/L (ref 100–108)
CO2 SERPL-SCNC: 32 MMOL/L (ref 21–32)
CREAT SERPL-MCNC: 1.16 MG/DL (ref 0.6–1.3)
EOSINOPHIL # BLD AUTO: 0.01 THOUSAND/ΜL (ref 0–0.61)
EOSINOPHIL NFR BLD AUTO: 0 % (ref 0–6)
ERYTHROCYTE [DISTWIDTH] IN BLOOD BY AUTOMATED COUNT: 11.4 % (ref 11.6–15.1)
GFR SERPL CREATININE-BSD FRML MDRD: 82 ML/MIN/1.73SQ M
GLUCOSE SERPL-MCNC: 124 MG/DL (ref 65–140)
HCT VFR BLD AUTO: 46.3 % (ref 36.5–49.3)
HGB BLD-MCNC: 15.8 G/DL (ref 12–17)
IMM GRANULOCYTES # BLD AUTO: 0.08 THOUSAND/UL (ref 0–0.2)
IMM GRANULOCYTES NFR BLD AUTO: 1 % (ref 0–2)
LIPASE SERPL-CCNC: 75 U/L (ref 73–393)
LYMPHOCYTES # BLD AUTO: 1.97 THOUSANDS/ΜL (ref 0.6–4.47)
LYMPHOCYTES NFR BLD AUTO: 12 % (ref 14–44)
MCH RBC QN AUTO: 29.3 PG (ref 26.8–34.3)
MCHC RBC AUTO-ENTMCNC: 34.1 G/DL (ref 31.4–37.4)
MCV RBC AUTO: 86 FL (ref 82–98)
MONOCYTES # BLD AUTO: 0.95 THOUSAND/ΜL (ref 0.17–1.22)
MONOCYTES NFR BLD AUTO: 6 % (ref 4–12)
NEUTROPHILS # BLD AUTO: 13.72 THOUSANDS/ΜL (ref 1.85–7.62)
NEUTS SEG NFR BLD AUTO: 81 % (ref 43–75)
NRBC BLD AUTO-RTO: 0 /100 WBCS
PLATELET # BLD AUTO: 476 THOUSANDS/UL (ref 149–390)
PMV BLD AUTO: 10.4 FL (ref 8.9–12.7)
POTASSIUM SERPL-SCNC: 3.3 MMOL/L (ref 3.5–5.3)
PROT SERPL-MCNC: 8.6 G/DL (ref 6.4–8.2)
RBC # BLD AUTO: 5.4 MILLION/UL (ref 3.88–5.62)
SODIUM SERPL-SCNC: 136 MMOL/L (ref 136–145)
WBC # BLD AUTO: 16.75 THOUSAND/UL (ref 4.31–10.16)

## 2020-04-12 PROCEDURE — 36415 COLL VENOUS BLD VENIPUNCTURE: CPT | Performed by: EMERGENCY MEDICINE

## 2020-04-12 PROCEDURE — 99283 EMERGENCY DEPT VISIT LOW MDM: CPT

## 2020-04-12 PROCEDURE — 85025 COMPLETE CBC W/AUTO DIFF WBC: CPT | Performed by: EMERGENCY MEDICINE

## 2020-04-12 PROCEDURE — 96366 THER/PROPH/DIAG IV INF ADDON: CPT

## 2020-04-12 PROCEDURE — 83690 ASSAY OF LIPASE: CPT | Performed by: EMERGENCY MEDICINE

## 2020-04-12 PROCEDURE — 99284 EMERGENCY DEPT VISIT MOD MDM: CPT | Performed by: EMERGENCY MEDICINE

## 2020-04-12 PROCEDURE — 80053 COMPREHEN METABOLIC PANEL: CPT | Performed by: EMERGENCY MEDICINE

## 2020-04-12 PROCEDURE — 96375 TX/PRO/DX INJ NEW DRUG ADDON: CPT

## 2020-04-12 PROCEDURE — 96365 THER/PROPH/DIAG IV INF INIT: CPT

## 2020-04-12 RX ORDER — SODIUM CHLORIDE, SODIUM GLUCONATE, SODIUM ACETATE, POTASSIUM CHLORIDE, MAGNESIUM CHLORIDE, SODIUM PHOSPHATE, DIBASIC, AND POTASSIUM PHOSPHATE .53; .5; .37; .037; .03; .012; .00082 G/100ML; G/100ML; G/100ML; G/100ML; G/100ML; G/100ML; G/100ML
1000 INJECTION, SOLUTION INTRAVENOUS ONCE
Status: COMPLETED | OUTPATIENT
Start: 2020-04-12 | End: 2020-04-12

## 2020-04-12 RX ORDER — POTASSIUM CHLORIDE 750 MG/1
10 TABLET, EXTENDED RELEASE ORAL 2 TIMES DAILY
Qty: 10 TABLET | Refills: 0 | Status: SHIPPED | OUTPATIENT
Start: 2020-04-12 | End: 2022-01-26

## 2020-04-12 RX ORDER — ONDANSETRON 2 MG/ML
4 INJECTION INTRAMUSCULAR; INTRAVENOUS ONCE
Status: COMPLETED | OUTPATIENT
Start: 2020-04-12 | End: 2020-04-12

## 2020-04-12 RX ORDER — POTASSIUM CHLORIDE 20 MEQ/1
20 TABLET, EXTENDED RELEASE ORAL ONCE
Status: COMPLETED | OUTPATIENT
Start: 2020-04-12 | End: 2020-04-12

## 2020-04-12 RX ORDER — ONDANSETRON 4 MG/1
4 TABLET, FILM COATED ORAL EVERY 6 HOURS
Qty: 12 TABLET | Refills: 0 | Status: SHIPPED | OUTPATIENT
Start: 2020-04-12 | End: 2022-01-26

## 2020-04-12 RX ADMIN — SODIUM CHLORIDE, SODIUM GLUCONATE, SODIUM ACETATE, POTASSIUM CHLORIDE, MAGNESIUM CHLORIDE, SODIUM PHOSPHATE, DIBASIC, AND POTASSIUM PHOSPHATE 1000 ML: .53; .5; .37; .037; .03; .012; .00082 INJECTION, SOLUTION INTRAVENOUS at 05:24

## 2020-04-12 RX ADMIN — POTASSIUM CHLORIDE 20 MEQ: 1500 TABLET, EXTENDED RELEASE ORAL at 06:11

## 2020-04-12 RX ADMIN — ONDANSETRON 4 MG: 2 INJECTION INTRAMUSCULAR; INTRAVENOUS at 05:26

## 2021-05-13 ENCOUNTER — HOSPITAL ENCOUNTER (EMERGENCY)
Facility: HOSPITAL | Age: 36
Discharge: HOME | End: 2021-05-13
Attending: EMERGENCY MEDICINE | Admitting: EMERGENCY MEDICINE

## 2021-05-13 VITALS
DIASTOLIC BLOOD PRESSURE: 122 MMHG | OXYGEN SATURATION: 97 % | RESPIRATION RATE: 18 BRPM | HEART RATE: 77 BPM | SYSTOLIC BLOOD PRESSURE: 177 MMHG | TEMPERATURE: 97.6 F

## 2021-05-13 DIAGNOSIS — R73.9 HYPERGLYCEMIA: Primary | ICD-10-CM

## 2021-05-13 LAB
ANION GAP SERPL CALCULATED.3IONS-SCNC: 8 MMOL/L (ref 4–13)
BUN SERPL-MCNC: 16 MG/DL (ref 5–25)
CALCIUM SERPL-MCNC: 8.5 MG/DL (ref 8.3–10.1)
CHLORIDE SERPL-SCNC: 100 MMOL/L (ref 100–108)
CO2 SERPL-SCNC: 26 MMOL/L (ref 21–32)
CREAT SERPL-MCNC: 1.16 MG/DL (ref 0.6–1.3)
EST. AVERAGE GLUCOSE BLD GHB EST-MCNC: 278 MG/DL
GFR SERPL CREATININE-BSD FRML MDRD: 81 ML/MIN/1.73SQ M
GLUCOSE SERPL-MCNC: 290 MG/DL (ref 65–140)
GLUCOSE SERPL-MCNC: 393 MG/DL (ref 65–140)
GLUCOSE SERPL-MCNC: 404 MG/DL (ref 65–140)
GLUCOSE SERPL-MCNC: 448 MG/DL (ref 65–140)
HBA1C MFR BLD: 11.3 %
POTASSIUM SERPL-SCNC: 4.7 MMOL/L (ref 3.5–5.3)
SODIUM SERPL-SCNC: 134 MMOL/L (ref 136–145)

## 2021-05-13 PROCEDURE — 99283 EMERGENCY DEPT VISIT LOW MDM: CPT

## 2021-05-13 PROCEDURE — 36415 COLL VENOUS BLD VENIPUNCTURE: CPT | Performed by: EMERGENCY MEDICINE

## 2021-05-13 PROCEDURE — 96366 THER/PROPH/DIAG IV INF ADDON: CPT

## 2021-05-13 PROCEDURE — 82948 REAGENT STRIP/BLOOD GLUCOSE: CPT

## 2021-05-13 PROCEDURE — 96365 THER/PROPH/DIAG IV INF INIT: CPT

## 2021-05-13 PROCEDURE — 80048 BASIC METABOLIC PNL TOTAL CA: CPT | Performed by: EMERGENCY MEDICINE

## 2021-05-13 PROCEDURE — 83036 HEMOGLOBIN GLYCOSYLATED A1C: CPT | Performed by: EMERGENCY MEDICINE

## 2021-05-13 PROCEDURE — 99284 EMERGENCY DEPT VISIT MOD MDM: CPT | Performed by: EMERGENCY MEDICINE

## 2021-05-13 RX ORDER — SODIUM CHLORIDE, SODIUM GLUCONATE, SODIUM ACETATE, POTASSIUM CHLORIDE, MAGNESIUM CHLORIDE, SODIUM PHOSPHATE, DIBASIC, AND POTASSIUM PHOSPHATE .53; .5; .37; .037; .03; .012; .00082 G/100ML; G/100ML; G/100ML; G/100ML; G/100ML; G/100ML; G/100ML
500 INJECTION, SOLUTION INTRAVENOUS ONCE
Status: COMPLETED | OUTPATIENT
Start: 2021-05-13 | End: 2021-05-13

## 2021-05-13 RX ADMIN — SODIUM CHLORIDE, SODIUM GLUCONATE, SODIUM ACETATE, POTASSIUM CHLORIDE, MAGNESIUM CHLORIDE, SODIUM PHOSPHATE, DIBASIC, AND POTASSIUM PHOSPHATE 500 ML: .53; .5; .37; .037; .03; .012; .00082 INJECTION, SOLUTION INTRAVENOUS at 15:40

## 2021-05-13 RX ADMIN — SODIUM CHLORIDE, SODIUM GLUCONATE, SODIUM ACETATE, POTASSIUM CHLORIDE, MAGNESIUM CHLORIDE, SODIUM PHOSPHATE, DIBASIC, AND POTASSIUM PHOSPHATE 500 ML: .53; .5; .37; .037; .03; .012; .00082 INJECTION, SOLUTION INTRAVENOUS at 17:15

## 2021-05-13 NOTE — DISCHARGE INSTRUCTIONS
Take you medication 2 times daily, once in the morning and again in the evening  If you begin to experience GI discomfort (nausea, vomiting, diarrhea) start taking one pill daily

## 2021-05-13 NOTE — ED ATTENDING ATTESTATION
5/13/2021  I, Myke Tam MD, saw and evaluated the patient  I have discussed the patient with the resident/non-physician practitioner and agree with the resident's/non-physician practitioner's findings, Plan of Care, and MDM as documented in the resident's/non-physician practitioner's note, except where noted  All available labs and Radiology studies were reviewed  I was present for key portions of any procedure(s) performed by the resident/non-physician practitioner and I was immediately available to provide assistance  At this point I agree with the current assessment done in the Emergency Department  I have conducted an independent evaluation of this patient a history and physical is as follows:  Pt has had increased thirst and urination for 1 month  Pt checked glucose and in 400 range  No recent illness no nv or abd pain   PE: alert nad heart reg lungs clear abd soft nontender ext nad neuro nonfocal MDM: will check labs hydrate start metformin    Pt family member noted he takes percocet and has started with clinic Pt does not want further referrals at this time  ED Course         Critical Care Time  Procedures

## 2021-05-13 NOTE — ED PROVIDER NOTES
History  Chief Complaint   Patient presents with    Hyperglycemia - no symptoms     79-year-old male presents for evaluation hyperglycemia  Patient does not have a history of diabetes  States over the last day he has been testing his glucose at home with his mother's kit  Patient states glucoses have been elevated in the 400-500 range  Patient states over the last few months he has had increased thirst, increased urination  Patient has family history of diabetes on his father side  Patient denies any recent illnesses, has been in his usual state of health  He denies any other medical problems or daily medications  He admits to intermittent sensation of lightheadedness without syncope  He denies any abdominal pain, nausea, vomiting  Denies any other symptoms of dysuria  Prior to Admission Medications   Prescriptions Last Dose Informant Patient Reported? Taking?   ondansetron (ZOFRAN) 4 mg tablet   No No   Sig: Take 1 tablet (4 mg total) by mouth every 6 (six) hours   potassium chloride (K-DUR,KLOR-CON) 10 mEq tablet   No No   Sig: Take 1 tablet (10 mEq total) by mouth 2 (two) times a day for 5 days      Facility-Administered Medications: None       Past Medical History:   Diagnosis Date    Foot injury     Fractures        No past surgical history on file  No family history on file  I have reviewed and agree with the history as documented  E-Cigarette/Vaping     E-Cigarette/Vaping Substances     Social History     Tobacco Use    Smoking status: Never Smoker    Smokeless tobacco: Never Used   Substance Use Topics    Alcohol use: No    Drug use: Yes     Types: Marijuana     Comment: percocet        Review of Systems   Constitutional: Negative for appetite change, chills and fever  Gastrointestinal: Negative for abdominal pain, nausea and vomiting  Endocrine: Positive for polydipsia and polyuria  Genitourinary: Negative for dysuria  Neurological: Positive for light-headedness  Negative for syncope  All other systems reviewed and are negative  Physical Exam  ED Triage Vitals [05/13/21 1416]   Temperature Pulse Respirations Blood Pressure SpO2   97 6 °F (36 4 °C) 77 18 (!) 177/122 97 %      Temp Source Heart Rate Source Patient Position - Orthostatic VS BP Location FiO2 (%)   Tympanic Monitor Sitting Left arm --      Pain Score       No Pain             Orthostatic Vital Signs  Vitals:    05/13/21 1416   BP: (!) 177/122   Pulse: 77   Patient Position - Orthostatic VS: Sitting       Physical Exam  Vitals signs reviewed  Constitutional:       General: He is not in acute distress  Appearance: Normal appearance  He is obese  He is not ill-appearing, toxic-appearing or diaphoretic  HENT:      Head: Normocephalic and atraumatic  Right Ear: External ear normal       Left Ear: External ear normal    Eyes:      General:         Right eye: No discharge  Left eye: No discharge  Cardiovascular:      Rate and Rhythm: Normal rate and regular rhythm  Pulmonary:      Effort: Pulmonary effort is normal  No respiratory distress  Breath sounds: Normal breath sounds  Abdominal:      General: There is no distension  Palpations: Abdomen is soft  Tenderness: There is no abdominal tenderness  Musculoskeletal:         General: No deformity or signs of injury  Right lower leg: No edema  Left lower leg: No edema  Skin:     General: Skin is warm  Coloration: Skin is not jaundiced or pale  Neurological:      General: No focal deficit present  Mental Status: He is alert  Mental status is at baseline        Comments: No gross CN, motor, sensory deficits         ED Medications  Medications   multi-electrolyte (ISOLYTE-S PH 7 4) bolus 500 mL (0 mL Intravenous Stopped 5/13/21 1715)   multi-electrolyte (ISOLYTE-S PH 7 4) bolus 500 mL (0 mL Intravenous Stopped 5/13/21 1747)       Diagnostic Studies  Results Reviewed     Procedure Component Value Units Date/Time    Hemoglobin A1C [[id number]]  (Abnormal) Collected: 05/13/21 1540    Lab Status: Final result Specimen: Blood from Arm, Right Updated: 05/13/21 1758     Hemoglobin A1C 11 3 %       mg/dl     Fingerstick Glucose (POCT) [[id number]]  (Abnormal) Collected: 05/13/21 1739    Lab Status: Final result Updated: 05/13/21 1742     POC Glucose 290 mg/dl     Basic metabolic panel [[id number]]  (Abnormal) Collected: 05/13/21 1540    Lab Status: Final result Specimen: Blood from Arm, Right Updated: 05/13/21 1607     Sodium 134 mmol/L      Potassium 4 7 mmol/L      Chloride 100 mmol/L      CO2 26 mmol/L      ANION GAP 8 mmol/L      BUN 16 mg/dL      Creatinine 1 16 mg/dL      Glucose 448 mg/dL      Calcium 8 5 mg/dL      eGFR 81 ml/min/1 73sq m     Narrative:      [place] guidelines for Chronic Kidney Disease (CKD):     Stage 1 with normal or high GFR (GFR > 90 mL/min/1 73 square meters)    Stage 2 Mild CKD (GFR = 60-89 mL/min/1 73 square meters)    Stage 3A Moderate CKD (GFR = 45-59 mL/min/1 73 square meters)    Stage 3B Moderate CKD (GFR = 30-44 mL/min/1 73 square meters)    Stage 4 Severe CKD (GFR = 15-29 mL/min/1 73 square meters)    Stage 5 End Stage CKD (GFR <15 mL/min/1 73 square meters)  Note: GFR calculation is accurate only with a steady state creatinine    Fingerstick Glucose (POCT) [[id number]]  (Abnormal) Collected: 05/13/21 1455    Lab Status: Final result Updated: 05/13/21 1459     POC Glucose 393 mg/dl     Fingerstick Glucose (POCT) [[id number]]  (Abnormal) Collected: 05/13/21 1420    Lab Status: Final result Updated: 05/13/21 1421     POC Glucose 404 mg/dl                  No orders to display         Procedures  Procedures      ED Course  ED Course as of May 13 2043   Thu May 13, 2021   1613 Anion Gap: 8   1613 CO2: 26   1613 Potassium: 4 7   1615 Family updated to results, will complete 1L IVF then d/c on metformin                                           MDM  Number of Diagnoses or Management Options  Hyperglycemia:   Diagnosis management comments: 40-year-old male presents for evaluation of hyperglycemia  Patient has been experiencing increased thirst and urination over the past few months, worsened over this past month  Yesterday he checked his glucose with his mother stated that it to be elevated  On examination patient clinically appears well, do not suspect metabolic derangements  Finger glucose was elevated  Will perform BMP to rule out metabolic abnormalities, C6M for primary care follow-up, will give patient 1 L of IV fluids  Will likely discharge patient to home on metformin and have patient follow-up with his primary care physician as soon as possible  Disposition  Final diagnoses:   Hyperglycemia     Time reflects when diagnosis was documented in both MDM as applicable and the Disposition within this note     Time User Action Codes Description Comment    5/13/2021  4:40 PM Yessy Solis Add [R73 9] Hyperglycemia       ED Disposition     ED Disposition Condition Date/Time Comment    Discharge Stable Thu May 13, 2021  4:40 PM Yohana Stein discharge to home/self care              Follow-up Information     Follow up With Specialties Details Why [address] [facility] Schedule an appointment as soon as possible for a visit in 1 day  Noris  [address] [facility] [address]  [facility] [address], [address] [place] [address], [address] [phone]          Discharge Medication List as of 5/13/2021  5:14 PM      START taking these medications    Details   metFORMIN (GLUCOPHAGE) 500 mg tablet Take 1 tablet (500 mg total) by mouth 2 (two) times a day with meals for 14 days, Starting Thu 5/13/2021, Until Thu 5/27/2021, Print         CONTINUE these medications which have NOT CHANGED    Details   ondansetron (ZOFRAN) 4 mg tablet Take 1 tablet (4 mg total) by mouth every 6 (six) hours, Starting Sun 4/12/2020, Print      potassium chloride (K-DUR,KLOR-CON) 10 mEq tablet Take 1 tablet (10 mEq total) by mouth 2 (two) times a day for 5 days, Starting Sun 4/12/2020, Until Fri 4/17/2020, Print           No discharge procedures on file  PDMP Review     None           ED Provider  Attending physically available and evaluated Scot Gums  I managed the patient along with the ED Attending      Electronically Signed by         Teresia Merlin, DO  05/13/21 2043

## 2021-05-13 NOTE — Clinical Note
Leeann Talavera was seen and treated in our emergency department on 5/13/2021  Diagnosis: Hyperglycemia    Rex  may return to work on return date  He may return on this date: 05/14/2021         If you have any questions or concerns, please don't hesitate to call        Najma Bautista DO    ______________________________           _______________          _______________  Hospital Representative                              Date                                Time

## 2021-07-22 ENCOUNTER — HOSPITAL ENCOUNTER (EMERGENCY)
Facility: HOSPITAL | Age: 36
Discharge: HOME | End: 2021-07-22
Attending: EMERGENCY MEDICINE
Payer: COMMERCIAL

## 2021-07-22 VITALS
WEIGHT: 293.2 LBS | DIASTOLIC BLOOD PRESSURE: 112 MMHG | RESPIRATION RATE: 24 BRPM | HEART RATE: 81 BPM | SYSTOLIC BLOOD PRESSURE: 163 MMHG | TEMPERATURE: 98.3 F | BODY MASS INDEX: 39.77 KG/M2 | OXYGEN SATURATION: 98 %

## 2021-07-22 DIAGNOSIS — R45.4 ANGER REACTION: Primary | ICD-10-CM

## 2021-07-22 PROCEDURE — 99284 EMERGENCY DEPT VISIT MOD MDM: CPT

## 2021-07-22 PROCEDURE — 99282 EMERGENCY DEPT VISIT SF MDM: CPT | Performed by: EMERGENCY MEDICINE

## 2021-07-22 NOTE — ED PROVIDER NOTES
History  Chief Complaint   Patient presents with    Psychiatric Evaluation     pt found out that his wife of 15 years was cheating on him and in the heat of the arrgument  he said he "would beat up the man that she cheated with" this promped her to go to his family and have them call 46     38 yo male brought to the ED by [employer] for a psychiatric evaluation  The patient says he found out that his girlfriend of 15 years was seeing another man earlier today  He was upset by the revelation and "said some things I regret in the heat of the moment"  He was apparently threatening to attack the other man so his girlfriend called 9-1-1  The patient is no longer upset and has no plans to injure himself or anyone else  No alcohol or drug abuse today  He is simply requesting discharge from the ED  Prior to Admission Medications   Prescriptions Last Dose Informant Patient Reported? Taking?   metFORMIN (GLUCOPHAGE) 500 mg tablet   No No   Sig: Take 1 tablet (500 mg total) by mouth 2 (two) times a day with meals for 14 days   ondansetron (ZOFRAN) 4 mg tablet   No No   Sig: Take 1 tablet (4 mg total) by mouth every 6 (six) hours   potassium chloride (K-DUR,KLOR-CON) 10 mEq tablet   No No   Sig: Take 1 tablet (10 mEq total) by mouth 2 (two) times a day for 5 days      Facility-Administered Medications: None       Past Medical History:   Diagnosis Date    Foot injury     Fractures        History reviewed  No pertinent surgical history  History reviewed  No pertinent family history  I have reviewed and agree with the history as documented  E-Cigarette/Vaping     E-Cigarette/Vaping Substances     Social History     Tobacco Use    Smoking status: Never Smoker    Smokeless tobacco: Never Used   Substance Use Topics    Alcohol use: No    Drug use: Yes     Types: Marijuana     Comment: percocet       Review of Systems   Constitutional: Negative for chills and fever  HENT: Negative for sore throat      Respiratory: Negative for cough and shortness of breath  Cardiovascular: Negative for chest pain and palpitations  Gastrointestinal: Negative for abdominal pain, diarrhea, nausea and vomiting  Endocrine: Negative for cold intolerance and heat intolerance  Genitourinary: Negative for dysuria and flank pain  Musculoskeletal: Negative for back pain  Skin: Negative for rash  Allergic/Immunologic: Negative for immunocompromised state  Neurological: Negative for headaches  Hematological: Negative for adenopathy  Psychiatric/Behavioral: Negative for hallucinations, self-injury and suicidal ideas  The patient is not nervous/anxious  Physical Exam  Physical Exam  Constitutional:       General: He is not in acute distress  Appearance: He is well-developed  HENT:      Head: Normocephalic and atraumatic  Eyes:      Pupils: Pupils are equal, round, and reactive to light  Cardiovascular:      Rate and Rhythm: Normal rate and regular rhythm  Pulmonary:      Effort: Pulmonary effort is normal  No respiratory distress  Breath sounds: Normal breath sounds  Abdominal:      General: There is no distension  Palpations: Abdomen is soft  Tenderness: There is no abdominal tenderness  Musculoskeletal:         General: Normal range of motion  Cervical back: Normal range of motion and neck supple  Skin:     General: Skin is warm and dry  Neurological:      Mental Status: He is alert and oriented to person, place, and time  Psychiatric:         Attention and Perception: Attention normal          Speech: Speech normal          Behavior: Behavior is cooperative  Thought Content: Thought content does not include homicidal or suicidal ideation           Cognition and Memory: Cognition and memory normal          Judgment: Judgment normal          Vital Signs  ED Triage Vitals [07/22/21 1643]   Temperature Pulse Respirations Blood Pressure SpO2   98 3 °F (36 8 °C) 81 (!) 24 (!) 163/112 98 %      Temp Source Heart Rate Source Patient Position - Orthostatic VS BP Location FiO2 (%)   Tympanic Monitor Sitting Right arm --      Pain Score       --           Vitals:    07/22/21 1643   BP: (!) 163/112   Pulse: 81   Patient Position - Orthostatic VS: Sitting         Visual Acuity      ED Medications  Medications - No data to display    Diagnostic Studies  Results Reviewed     None                 No orders to display              Procedures  Procedures         ED Course                             SBIRT 20yo+      Most Recent Value   SBIRT (24 yo +)   In order to provide better care to our patients, we are screening all of our patients for alcohol and drug use  Would it be okay to ask you these screening questions? Yes Filed at: 07/22/2021 1648   Initial Alcohol Screen: US AUDIT-C    1  How often do you have a drink containing alcohol?  0 Filed at: 07/22/2021 1648   2  How many drinks containing alcohol do you have on a typical day you are drinking? 0 Filed at: 07/22/2021 1648   3a  Male UNDER 65: How often do you have five or more drinks on one occasion? 0 Filed at: 07/22/2021 1648   3b  FEMALE Any Age, or MALE 65+: How often do you have 4 or more drinks on one occassion? 0 Filed at: 07/22/2021 1648   Audit-C Score  0 Filed at: 07/22/2021 1648   LOUANN: How many times in the past year have you    Used an illegal drug or used a prescription medication for non-medical reasons? Never Filed at: 07/22/2021 1648                    MDM  Number of Diagnoses or Management Options  Anger reaction  Diagnosis management comments: The patient is calm and cooperative with the exam/history  He adamantly denies SI and HI but does admit to saying some things "in the heat of the moment" earlier today  He is appropriate, sober, and capable of making his own medical decisions  He does not appear to be a danger to himself or anyone else at this time  Crisis worker evaluated the patient and agrees with my assessment  Will discharge to home per patient request  Plan for PCP follow up as needed  Strict return precautions provided  Patient Progress  Patient progress: stable      Disposition  Final diagnoses:   Anger reaction     Time reflects when diagnosis was documented in both MDM as applicable and the Disposition within this note     Time User Action Codes Description Comment    7/22/2021  4:58 PM Lien Wood Add [R45 4] Anger reaction       ED Disposition     ED Disposition Condition Date/Time Comment    Discharge Stable u Jul 22, 2021  4:58 PM Shane Torres discharge to home/self care  Follow-up Information     Follow up With Specialties Details Why Contact Info Additional [facility] Schedule an appointment as soon as possible for a visit   [address], [address] [facility] [id number]  [address], [address], [address] [place], [place], [address] [place] [address]          Discharge Medication List as of 7/22/2021  5:06 PM      CONTINUE these medications which have NOT CHANGED    Details   metFORMIN (GLUCOPHAGE) 500 mg tablet Take 1 tablet (500 mg total) by mouth 2 (two) times a day with meals for 14 days, Starting u 5/13/2021, Until u 5/27/2021, Print      ondansetron (ZOFRAN) 4 mg tablet Take 1 tablet (4 mg total) by mouth every 6 (six) hours, Starting Sun 4/12/2020, Print      potassium chloride (K-DUR,KLOR-CON) 10 mEq tablet Take 1 tablet (10 mEq total) by mouth 2 (two) times a day for 5 days, Starting Sun 4/12/2020, Until Fri 4/17/2020, Print           No discharge procedures on file      PDMP Review     None          ED Provider  Electronically Signed by           Ismael Slade MD  07/23/21 1265

## 2021-07-22 NOTE — ED NOTES
Crisis at bedside        [facility]  07/22/21 4871

## 2021-07-23 NOTE — ED NOTES
Patient arrived by police called by his significant other of 14 years  He stated he threatened to kill himself and she called the police and his parents  Patient stated the precipitant was his finding via her texts that she has been cheating on him with another man for an unknown period of time  Patient stated he is upset and angry regarding this situation  He reports feeling betrayed and like he wasted 14 years of his life trying to prioritize their relationship due to their having children together  He stated he is not actually suicidal  Patient stated he reacted impulsively  He is forward-thinking at this time, communicating with his parents by phone  He stated the relationship has not been health for quite some time  He stated he will stay at his parents' home and then arrange to move his belongings out  He does not require an inpatient admission at this time  He stated he is agreeable to return to the ED if he feels worse

## 2021-11-29 ENCOUNTER — TELEPHONE (OUTPATIENT)
Dept: INTERNAL MEDICINE CLINIC | Facility: CLINIC | Age: 36
End: 2021-11-29

## 2022-01-26 ENCOUNTER — OFFICE VISIT (OUTPATIENT)
Dept: INTERNAL MEDICINE CLINIC | Facility: CLINIC | Age: 37
End: 2022-01-26

## 2022-01-26 VITALS
BODY MASS INDEX: 41.2 KG/M2 | HEART RATE: 81 BPM | TEMPERATURE: 97.8 F | OXYGEN SATURATION: 98 % | HEIGHT: 72 IN | DIASTOLIC BLOOD PRESSURE: 93 MMHG | WEIGHT: 304.2 LBS | SYSTOLIC BLOOD PRESSURE: 151 MMHG

## 2022-01-26 DIAGNOSIS — R73.09 ELEVATED GLUCOSE: ICD-10-CM

## 2022-01-26 DIAGNOSIS — Z13.1 SCREENING FOR DIABETES MELLITUS: ICD-10-CM

## 2022-01-26 DIAGNOSIS — E66.01 CLASS 3 SEVERE OBESITY DUE TO EXCESS CALORIES WITHOUT SERIOUS COMORBIDITY WITH BODY MASS INDEX (BMI) OF 40.0 TO 44.9 IN ADULT (HCC): Primary | ICD-10-CM

## 2022-01-26 DIAGNOSIS — Z11.59 NEED FOR HEPATITIS C SCREENING TEST: ICD-10-CM

## 2022-01-26 DIAGNOSIS — Z13.220 SCREENING, LIPID: ICD-10-CM

## 2022-01-26 DIAGNOSIS — Z13.29 SCREENING FOR THYROID DISORDER: ICD-10-CM

## 2022-01-26 DIAGNOSIS — J30.9 ALLERGIC RHINITIS, UNSPECIFIED SEASONALITY, UNSPECIFIED TRIGGER: ICD-10-CM

## 2022-01-26 DIAGNOSIS — R03.0 ELEVATED BLOOD-PRESSURE READING WITHOUT DIAGNOSIS OF HYPERTENSION: ICD-10-CM

## 2022-01-26 DIAGNOSIS — Z13.0 SCREENING FOR DEFICIENCY ANEMIA: ICD-10-CM

## 2022-01-26 DIAGNOSIS — Z11.4 SCREENING FOR HIV (HUMAN IMMUNODEFICIENCY VIRUS): ICD-10-CM

## 2022-01-26 PROCEDURE — 99203 OFFICE O/P NEW LOW 30 MIN: CPT | Performed by: PHYSICIAN ASSISTANT

## 2022-01-26 RX ORDER — FLUTICASONE PROPIONATE 50 MCG
2 SPRAY, SUSPENSION (ML) NASAL DAILY
Qty: 11.1 ML | Refills: 1 | Status: SHIPPED | OUTPATIENT
Start: 2022-01-26 | End: 2022-03-22 | Stop reason: SDUPTHER

## 2022-01-26 NOTE — PROGRESS NOTES
Assessment/Plan:      Diagnoses and all orders for this visit:    Class 3 severe obesity due to excess calories without serious comorbidity with body mass index (BMI) of 40 0 to 44 9 in adult [facility])  -     Comprehensive metabolic panel; Future  -     Hemoglobin A1C; Future  -     TSH, 3rd generation with Free T4 reflex; Future  -     Lipid panel; Future    Elevated glucose  -     Comprehensive metabolic panel; Future  -     Hemoglobin A1C; Future    Elevated blood-pressure reading without diagnosis of hypertension    Allergic rhinitis, unspecified seasonality, unspecified trigger  -     fluticasone (FLONASE) 50 mcg/act nasal spray; 2 sprays into each nostril daily    Screening for deficiency anemia  -     CBC and differential; Future    Screening for diabetes mellitus  -     Comprehensive metabolic panel; Future  -     Hemoglobin A1C; Future    Screening for thyroid disorder  -     TSH, 3rd generation with Free T4 reflex; Future    Screening, lipid  -     Lipid panel; Future    Screening for HIV (human immunodeficiency virus)  -     HIV 1/2 Antigen/Antibody (4th Generation) w Reflex [facility]; Future    Need for hepatitis C screening test  -     Hepatitis C antibody; Future      patient is a very pleasant 51-year-old male presenting today to establish with office  He is interested in getting some routine blood work to include sugar and diabetes workup  He states that he had started checking some sugars a few months ago and they had been significantly elevated to 50s to 400s  Metformin was in his med list and he reports that he was given this in the ED when he went for elevated glucose but never followed up with a PCP  He was reporting some symptoms of high blood sugar including polydipsia and polyuria but symptoms currently not present    Would recommend obtaining blood work including fasting glucose, renal function and A1c and we will determine if he is diabetic and at follow-up will discuss appropriate treatment plan  He is also agreeable further routine screening blood work to include CBC, CMP, lipids as well as TSH, HIV and hep C  He will get his blood work done fasting  Patient's blood pressure was elevated during the visit today at 151/93  He is not aware of any history of hypertension  Last year he was seen in the emergency room in May and July in both times blood pressure also recorded significantly elevated as well  I am highly suspicious for hypertension however I would recommend reassessing at follow-up and if it remains elevated should consider initiating medication  I had a long discussion with him today regarding the concern for having these medical conditions as well as his significantly elevated BMI of 41 26  Patient does recommend that unhealthy lifestyle with having an unhealthy weight, poor dietary habits and lack of exercise can all contribute to medical conditions  He understands that weight loss and improvement with his diet as well as regular exercise can improve his health naturally including lowering his blood sugars and his blood pressure  He also understands that sleep apnea can be secondary to morbid obesity  We may need to consider a sleep study which can be discussed at next visit  I explained to him what sleep apnea is and how it can affect his other medical conditions and complicate them  Patient understands that all boils down to his weight and he agrees to start working on weight loss with dietary change and exercise before the next visit  Patient declines influenza vaccine today despite recommendation  Primary series for COVID-19 vaccine completed with card copy today  Patient thinks he may have gotten Tdap through his previous job will look into this  If we have no record I would recommend updating  Patient was sniffling quite a bit during the visit    He denies any cold symptoms but states that he has had some sneezing and runny nose for a while and has a history of allergies  He takes Zyrtec intermittently  No other cold symptoms at this time and I do not see any need for any kind of COVID testing  Would recommend starting Zyrtec on a regular basis and also will add Flonase which was prescribed for him today  Patient agrees to follow-up in the office in about 3 weeks to reassess his blood pressure, his weight and review blood work results and determine a further concrete treatment plan  Chief Complaint   Patient presents with    Follow-up     patient wants to get his sugar tested- patient used a machine at home and numbers were high        Subjective:     Patient ID: Royce Sams is a 39 y o  male     35y/o male here today for NP establish and concern for elevated glucose  states started checking sugars at random when he goes to his mother's house about 3 months ago  States was having frequent urination and some excessive thirst  sxs no longer present  States sugars were generally been 250-400's  States he has tried to walk a little more since then, hasnt changed his diet  He is not aware of FHx of diabetes  Does note FHx of thyroid issues  He denies any known active medical problems otherwise  States he thinks he was supposed to go for testing for NEFTALY but never went  Appears NEFTALY in problem list to reconcile  States he does have trouble with weight fluctuation, was down to 250 in summer  He did get covid vaccine series completed - card copied today  Review of Systems   Constitutional: Negative  Respiratory: Negative  Cardiovascular: Negative  Gastrointestinal: Negative  Endocrine:        As in HPI - had elevated BS sxs, though no complaints now   Genitourinary: Negative  Musculoskeletal: Negative  Skin: Negative  Psychiatric/Behavioral: Negative            The following portions of the patient's history were reviewed and updated as appropriate: allergies, current medications, past family history, past medical history, past social history, past surgical history and problem list       Objective:     Physical Exam  Vitals reviewed  Constitutional:       General: He is not in acute distress  Appearance: He is obese  He is not ill-appearing or toxic-appearing  Comments: BMI 41 26   HENT:      Head: Normocephalic and atraumatic  Neck:      Thyroid: No thyroid tenderness  Vascular: Normal carotid pulses  No carotid bruit  Cardiovascular:      Rate and Rhythm: Normal rate and regular rhythm  Pulses:           Carotid pulses are 2+ on the right side and 2+ on the left side  Radial pulses are 2+ on the right side and 2+ on the left side  Heart sounds: Normal heart sounds  Pulmonary:      Effort: Pulmonary effort is normal       Breath sounds: Normal breath sounds  Musculoskeletal:      Cervical back: Neck supple  Right lower leg: No edema  Left lower leg: No edema  Lymphadenopathy:      Head:      Right side of head: No submandibular or tonsillar adenopathy  Left side of head: No submandibular or tonsillar adenopathy  Neurological:      Mental Status: He is alert and oriented to person, place, and time  Psychiatric:         Mood and Affect: Mood normal          Speech: Speech normal          Behavior: Behavior normal  Behavior is cooperative             Vitals:    01/26/22 1535   BP: 151/93   BP Location: Left arm   Patient Position: Sitting   Cuff Size: Large   Pulse: 81   Temp: 97 8 °F (36 6 °C)   TempSrc: Temporal   SpO2: 98%   Weight: (!) 138 kg (304 lb 3 2 oz)   Height: 6' (1 829 m)

## 2022-02-16 ENCOUNTER — TELEPHONE (OUTPATIENT)
Dept: INTERNAL MEDICINE CLINIC | Facility: CLINIC | Age: 37
End: 2022-02-16

## 2022-03-22 ENCOUNTER — OFFICE VISIT (OUTPATIENT)
Dept: INTERNAL MEDICINE CLINIC | Facility: CLINIC | Age: 37
End: 2022-03-22

## 2022-03-22 VITALS
BODY MASS INDEX: 42.72 KG/M2 | OXYGEN SATURATION: 95 % | TEMPERATURE: 98.2 F | WEIGHT: 315 LBS | SYSTOLIC BLOOD PRESSURE: 140 MMHG | HEART RATE: 81 BPM | DIASTOLIC BLOOD PRESSURE: 94 MMHG

## 2022-03-22 DIAGNOSIS — I10 PRIMARY HYPERTENSION: ICD-10-CM

## 2022-03-22 DIAGNOSIS — J30.9 ALLERGIC RHINITIS, UNSPECIFIED SEASONALITY, UNSPECIFIED TRIGGER: ICD-10-CM

## 2022-03-22 DIAGNOSIS — E11.9 TYPE 2 DIABETES MELLITUS WITHOUT COMPLICATION, WITHOUT LONG-TERM CURRENT USE OF INSULIN (HCC): Primary | ICD-10-CM

## 2022-03-22 DIAGNOSIS — E66.01 CLASS 3 SEVERE OBESITY DUE TO EXCESS CALORIES WITH BODY MASS INDEX (BMI) OF 40.0 TO 44.9 IN ADULT, UNSPECIFIED WHETHER SERIOUS COMORBIDITY PRESENT (HCC): ICD-10-CM

## 2022-03-22 LAB — SL AMB POCT HEMOGLOBIN AIC: 9.7 (ref ?–6.5)

## 2022-03-22 PROCEDURE — 99213 OFFICE O/P EST LOW 20 MIN: CPT | Performed by: INTERNAL MEDICINE

## 2022-03-22 PROCEDURE — 3077F SYST BP >= 140 MM HG: CPT | Performed by: INTERNAL MEDICINE

## 2022-03-22 PROCEDURE — 83036 HEMOGLOBIN GLYCOSYLATED A1C: CPT | Performed by: INTERNAL MEDICINE

## 2022-03-22 PROCEDURE — 3080F DIAST BP >= 90 MM HG: CPT | Performed by: INTERNAL MEDICINE

## 2022-03-22 RX ORDER — METFORMIN HYDROCHLORIDE 750 MG/1
TABLET, EXTENDED RELEASE ORAL
Qty: 60 TABLET | Refills: 2 | Status: SHIPPED | OUTPATIENT
Start: 2022-03-22 | End: 2022-07-18

## 2022-03-22 RX ORDER — FLUTICASONE PROPIONATE 50 MCG
2 SPRAY, SUSPENSION (ML) NASAL DAILY
Qty: 11.1 ML | Refills: 1 | Status: SHIPPED | OUTPATIENT
Start: 2022-03-22 | End: 2022-05-23

## 2022-03-22 RX ORDER — METFORMIN HYDROCHLORIDE 500 MG/1
1000 TABLET, EXTENDED RELEASE ORAL 2 TIMES DAILY WITH MEALS
Qty: 30 TABLET | Refills: 2 | Status: CANCELLED | OUTPATIENT
Start: 2022-03-22

## 2022-03-22 RX ORDER — LOSARTAN POTASSIUM 25 MG/1
25 TABLET ORAL DAILY
Qty: 30 TABLET | Refills: 2 | Status: SHIPPED | OUTPATIENT
Start: 2022-03-22 | End: 2022-07-18

## 2022-03-22 NOTE — ASSESSMENT & PLAN NOTE
Pt states he gasps for air during sleep and was told he has sleep apnea  Has not completed a formal sleep study      · Discussed with pt that a sleep study will confirm sleep apnea and allow him to get a CPAP machine and that controlling apnea with CPAP machine will help him lose weight and control his BP  · Pt declined referral for sleep study because he believes having the CPAP machine will make him accept his obesity and make him less motivated to lose weight  · Will readdress at next visit

## 2022-03-22 NOTE — ASSESSMENT & PLAN NOTE
/94 today  Was 151/93 at last office visit in 2 months ago  Not on any antihypertensives  Endorses occasional mild headaches  Denies chest pain, dyspnea on exertion, change in vision      · Start losartan 25 mg daily  · Encouraged pt to complete outstanding blood work listed in type 2 diabetes

## 2022-03-22 NOTE — PROGRESS NOTES
[facility]  INTERNAL MEDICINE OFFICE VISIT     PATIENT INFORMATION     Lizz Suh   39 y o  male   MRN: [id number]    ASSESSMENT/PLAN     Problem List Items Addressed This Visit        Endocrine    Type 2 diabetes mellitus ([address] ) - Primary       Lab Results   Component Value Date    HGBA1C 11 3 (H) 05/13/2021     POC a1c 9 7  Last a1c in May 2021 was 11 3  Patient was given metformin at the time but is currently not on diabetes medications  Pt endorses polydipsia and intermittent polyuria  · Start metformin  mg 2 tablets with dinner daily  · Start Trulicity 6 65 mg weekly x 4 weeks  · Consider increasing Trulicity to 1 5 mg after 4 weeks  · Ordered urine microalbumin/creatinine  · Will schedule nurse visit for IRIS exam  · Referral to diabetes education  · Advised pt to check blood sugars in the morning and at bedtime and keep a log  · Encouraged pt to complete outstanding lab work (CBC, CMP, TSH, lipid panel, HIV)  · Follow up in 4 weeks         Relevant Medications    losartan (COZAAR) 25 mg tablet    metFORMIN (GLUCOPHAGE-XR) 750 mg 24 hr tablet    Dulaglutide 0 75 MG/0 5ML SOPN    Other Relevant Orders    Ambulatory Referral to Diabetic Education    Lancets    Glucometer    Glucometer test strips    Microalbumin / creatinine urine ratio    IRIS Diabetic eye exam    POCT hemoglobin A1c       Respiratory    Allergic rhinitis     Refilled Flonase           Relevant Medications    fluticasone (FLONASE) 50 mcg/act nasal spray       Cardiovascular and Mediastinum    Primary hypertension     /94 today  Was 151/93 at last office visit in 2 months ago  Not on any antihypertensives  Endorses occasional mild headaches  Denies chest pain, dyspnea on exertion, change in vision      · Start losartan 25 mg daily  · Encouraged pt to complete outstanding blood work listed in type 2 diabetes         Relevant Medications    losartan (COZAAR) 25 mg tablet       Other    Obesity Discussed lifestyle modifications as below in BMI counseling  BMI Counseling: Body mass index is 42 72 kg/m²  The BMI is above normal  Nutrition recommendations include reducing portion sizes and moderation in carbohydrate intake  Exercise recommendations include exercising 3-5 times per week  Schedule a follow-up appointment in 4 weeks  HEALTH MAINTENANCE     Immunization History   Administered Date(s) Administered    COVID-19 PFIZER VACCINE 0 3 ML IM 09/26/2021, 11/18/2021    Tdap 06/25/2009    Tuberculin Skin Test-PPD Intradermal 12/21/2012, 12/16/2014, 12/22/2016     CHIEF COMPLAINT     Chief Complaint   Patient presents with    Follow-up     BP/Weight      HISTORY OF PRESENT ILLNESS     40 y/o male with history of type 2 diabetes, obesity, NEFTALY here for f/u BP, weight  At last visit in January, lab work was ordered but unfortunately pt was unable to get it done  He recently started a new job doing deliveries for [employer]  He feels well while at work and denies chest pain, dyspnea on exertion  Occasionally has mild headaches  Pt acknowledges that his blood pressure is high and has diabetes  Currently not on any diabetes medications  Endorses increased thirst and intermittent polyuria  He is motivated to lose weight and control the diabetes  He started walking with his family outside and decreased meal portions last week  Pt states that he was told he had sleep apnea due to gasping for air during sleep  However, he has not had any formal sleep study  He declines referral for sleep study today because he believes that using a CPAP machine will make him accept his obesity and ruin his motivation to lose weight  REVIEW OF SYSTEMS     Review of Systems   Constitutional: Negative for chills and fever  Eyes: Negative for visual disturbance  Respiratory: Negative for shortness of breath  Cardiovascular: Negative for chest pain     Gastrointestinal: Negative for abdominal pain, constipation, diarrhea, nausea and vomiting  Endocrine: Positive for polydipsia and polyuria  Musculoskeletal: Negative for back pain  Neurological: Positive for headaches (mild, occasional)  OBJECTIVE     Vitals:    03/22/22 1419   BP: 140/94   BP Location: Left arm   Patient Position: Sitting   Cuff Size: Large   Pulse: 81   Temp: 98 2 °F (36 8 °C)   TempSrc: Temporal   SpO2: 95%   Weight: (!) 143 kg (315 lb)     Physical Exam  Constitutional:       General: He is not in acute distress  Appearance: He is obese  Eyes:      General: No scleral icterus  Cardiovascular:      Rate and Rhythm: Normal rate and regular rhythm  Pulses: Normal pulses  Heart sounds: Normal heart sounds  No murmur heard  Pulmonary:      Breath sounds: No wheezing, rhonchi or rales  Musculoskeletal:      Right lower leg: No edema  Left lower leg: No edema  Skin:     General: Skin is warm and dry  Neurological:      Mental Status: He is alert  Psychiatric:         Behavior: Behavior normal        CURRENT MEDICATIONS     Current Outpatient Medications:     fluticasone (FLONASE) 50 mcg/act nasal spray, 2 sprays into each nostril daily, Disp: 11 1 mL, Rfl: 1    Dulaglutide 0 75 MG/0 5ML SOPN, Inject 0 5 mL (0 75 mg total) under the skin once a week, Disp: 2 mL, Rfl: 0    losartan (COZAAR) 25 mg tablet, Take 1 tablet (25 mg total) by mouth daily, Disp: 30 tablet, Rfl: 2    metFORMIN (GLUCOPHAGE-XR) 750 mg 24 hr tablet, Take 2 tablets with dinner daily, Disp: 60 tablet, Rfl: 2    PAST MEDICAL & SURGICAL HISTORY     Past Medical History:   Diagnosis Date    Foot injury     Fractures      History reviewed  No pertinent surgical history    SOCIAL & FAMILY HISTORY     Social History     Socioeconomic History    Marital status: Single     Spouse name: Not on file    Number of children: Not on file    Years of education: Not on file    Highest education level: Not on file   Occupational History    Not on file   Tobacco Use    Smoking status: Never Smoker    Smokeless tobacco: Never Used   Vaping Use    Vaping Use: Never used   Substance and Sexual Activity    Alcohol use: No    Drug use: Yes     Types: Marijuana     Comment: percocet    Sexual activity: Not on file   Other Topics Concern    Not on file   Social History Narrative    Not on file     Social Determinants of Health     Financial Resource Strain: Low Risk     Difficulty of Paying Living Expenses: Not hard at all   Food Insecurity: No Food Insecurity    Worried About Running Out of Food in the Last Year: Never true    Annika of Food in the Last Year: Never true   Transportation Needs: No Transportation Needs    Lack of Transportation (Medical): No    Lack of Transportation (Non-Medical): No   Physical Activity: Sufficiently Active    Days of Exercise per Week: 6 days    Minutes of Exercise per Session: 60 min   Stress: No Stress Concern Present    Feeling of Stress : Not at all   Social Connections: Socially Isolated    Frequency of Communication with Friends and Family: More than three times a week    Frequency of Social Gatherings with Friends and Family: More than three times a week    Attends Cheondoism Services: Never    Active Member of Clubs or Organizations: No    Attends Club or Organization Meetings: Never    Marital Status: Never    Intimate Partner Violence: Not on file   Housing Stability: Low Risk     Unable to Pay for Housing in the Last Year: No    Number of Places Lived in the Last Year: 1    Unstable Housing in the Last Year: No     Social History     Substance and Sexual Activity   Alcohol Use No     Substance and Sexual Activity   Alcohol Use No        Substance and Sexual Activity   Drug Use Yes    Types: Marijuana    Comment: percocet     Social History     Tobacco Use   Smoking Status Never Smoker   Smokeless Tobacco Never Used     History reviewed  No pertinent family history  ==  Florin Jay DO  Internal Medicine Resident, PGY-1  [facility] [address] , [address] [address] [facility], [address] [facility]  Office: [phone]  Fax: [phone]

## 2022-03-22 NOTE — ASSESSMENT & PLAN NOTE
Lab Results   Component Value Date    HGBA1C 11 3 (H) 05/13/2021     POC a1c 9 7  Last a1c in May 2021 was 11 3  Patient was given metformin at the time but is currently not on diabetes medications  Pt endorses polydipsia and intermittent polyuria      · Start metformin  mg 2 tablets with dinner daily  · Start Trulicity 0 84 mg weekly x 4 weeks  · Consider increasing Trulicity to 1 5 mg after 4 weeks  · Ordered urine microalbumin/creatinine  · Will schedule nurse visit for IRIS exam  · Referral to diabetes education  · Advised pt to check blood sugars in the morning and at bedtime and keep a log  · Encouraged pt to complete outstanding lab work (CBC, CMP, TSH, lipid panel, HIV)  · Follow up in 4 weeks

## 2022-03-31 ENCOUNTER — TELEPHONE (OUTPATIENT)
Dept: INTERNAL MEDICINE CLINIC | Facility: CLINIC | Age: 37
End: 2022-03-31

## 2022-03-31 NOTE — TELEPHONE ENCOUNTER
Received fax stating trulicity injections not covered, prior auth started through covermymeds with KEY: [id number], will follow up in 2 business days

## 2022-04-01 NOTE — TELEPHONE ENCOUNTER
I spoke to the Pharmacist at [facility] and she advised that he picked up the script on 3/29/22

## 2022-04-04 ENCOUNTER — TELEPHONE (OUTPATIENT)
Dept: INTERNAL MEDICINE CLINIC | Facility: CLINIC | Age: 37
End: 2022-04-04

## 2022-04-04 NOTE — TELEPHONE ENCOUNTER
Patient called to request that the orders for his Diabetic Supply be sent to the Pharmacy on [facility]   Which are Glucometer test strips,Glucometer,Lancets which these orders are in the other order tab section  Please call patient once these have been called over [phone]

## 2022-04-04 NOTE — TELEPHONE ENCOUNTER
Orders printed out, need to be signed by attending then will fax to [facility] at [phone] - contact patient when faxed

## 2022-05-06 ENCOUNTER — TELEPHONE (OUTPATIENT)
Dept: INTERNAL MEDICINE CLINIC | Facility: CLINIC | Age: 37
End: 2022-05-06

## 2022-05-09 DIAGNOSIS — E11.9 TYPE 2 DIABETES MELLITUS WITHOUT COMPLICATION, WITHOUT LONG-TERM CURRENT USE OF INSULIN (HCC): ICD-10-CM

## 2022-05-09 NOTE — TELEPHONE ENCOUNTER
Patient states he ran out of the medication Dulaglutide,0 75 MG   Patient does have an appt on 8/29/2022

## 2022-05-11 ENCOUNTER — TELEPHONE (OUTPATIENT)
Dept: DIABETES SERVICES | Facility: OTHER | Age: 37
End: 2022-05-11

## 2022-06-27 ENCOUNTER — TELEPHONE (OUTPATIENT)
Dept: DIABETES SERVICES | Facility: OTHER | Age: 37
End: 2022-06-27

## 2022-07-25 ENCOUNTER — TELEPHONE (OUTPATIENT)
Dept: DIABETES SERVICES | Facility: OTHER | Age: 37
End: 2022-07-25

## 2022-07-25 NOTE — TELEPHONE ENCOUNTER
3rd attempt to call patient regarding diabetes education referral   Referral will be closed if pt does not return call

## 2022-08-02 ENCOUNTER — OFFICE VISIT (OUTPATIENT)
Dept: DENTISTRY | Facility: CLINIC | Age: 37
End: 2022-08-02

## 2022-08-02 VITALS — TEMPERATURE: 95.6 F

## 2022-08-02 DIAGNOSIS — Z00.00 ENCOUNTER FOR SCREENING AND PREVENTATIVE CARE: Primary | ICD-10-CM

## 2022-08-02 PROCEDURE — D0210 INTRAORAL - COMPLETE SERIES OF RADIOGRAPHIC IMAGES: HCPCS

## 2022-08-02 NOTE — PROGRESS NOTES
Reviewed medical history with pt  ASA II  CC: none-been 10 yrs since dental visit    [address] [facility]  Very heavy gen   plaque and calculus  Root tips only #30    NV: preauth/Full mouth debridement 60 mins with [facility]    PC/COMP EXAM

## 2022-09-28 ENCOUNTER — TELEPHONE (OUTPATIENT)
Dept: OTHER | Facility: OTHER | Age: 37
End: 2022-09-28

## 2022-09-28 NOTE — TELEPHONE ENCOUNTER
Call from patient requesting to transfer his PCP care from [facility] to Dr Kris Pappas because his family uses the office  Please call patient

## 2022-11-30 ENCOUNTER — VBI (OUTPATIENT)
Dept: ADMINISTRATIVE | Facility: OTHER | Age: 37
End: 2022-11-30

## 2022-11-30 NOTE — TELEPHONE ENCOUNTER
11/30/22 10:20 AM     VB [employer] SmartForm used to document caregap status      Blane Dickinson LMP 2006

## 2023-02-01 ENCOUNTER — OFFICE VISIT (OUTPATIENT)
Dept: INTERNAL MEDICINE CLINIC | Facility: CLINIC | Age: 38
End: 2023-02-01

## 2023-02-01 VITALS
WEIGHT: 309 LBS | HEIGHT: 73 IN | TEMPERATURE: 98.7 F | OXYGEN SATURATION: 95 % | SYSTOLIC BLOOD PRESSURE: 145 MMHG | BODY MASS INDEX: 40.95 KG/M2 | DIASTOLIC BLOOD PRESSURE: 94 MMHG | HEART RATE: 65 BPM

## 2023-02-01 DIAGNOSIS — I10 PRIMARY HYPERTENSION: ICD-10-CM

## 2023-02-01 DIAGNOSIS — G47.33 OBSTRUCTIVE SLEEP APNEA: ICD-10-CM

## 2023-02-01 DIAGNOSIS — E11.9 TYPE 2 DIABETES MELLITUS WITHOUT COMPLICATION, WITHOUT LONG-TERM CURRENT USE OF INSULIN (HCC): Primary | ICD-10-CM

## 2023-02-01 LAB — SL AMB POCT HEMOGLOBIN AIC: 9.7 (ref ?–6.5)

## 2023-02-01 RX ORDER — LANCETS
EACH MISCELLANEOUS
Qty: 100 EACH | Refills: 3 | Status: SHIPPED | OUTPATIENT
Start: 2023-02-01

## 2023-02-01 NOTE — PROGRESS NOTES
[facility]  INTERNAL MEDICINE OFFICE VISIT     PATIENT INFORMATION     Sarah Gilbert   40 y o  male   MRN: [id number]     ASSESSMENT/PLAN     Diagnoses and all orders for this visit:    Type 2 diabetes mellitus without complication, without long-term current use of insulin ([facility] [address] )  POCT A1C of 9 7%  Pt with medication non compliance, only taking metformin 3/7 days a week  Checks a post-prandial BG 3x per week which is normally 200-300  Works as a  for [employer], so is eating on the road with frequent sugary snacks at night  No exercises  Pt was educated on medication compliance and was given advice to take his medication routinely  Increased metformin to 1000 mg BID (was on 750 mg qd) and also increased Trulicity to 1 5 mg/wk  Pt referred to optometrist  Diabetic eye exam completed in the office today  DM foot exam completed without abnormalities  -     Lipid Panel with Direct LDL reflex; Future  -     CBC and differential; Future  -     Comprehensive metabolic panel; Future  -     Ambulatory Referral to Optometry; Future  -     Glucometer test strips  -     Lancets (onetouch ultrasoft) lancets; Use as instructed  -     IRIS Diabetic eye exam  -     metFORMIN (GLUCOPHAGE) 1000 MG tablet; Take 1 tablet (1,000 mg total) by mouth 2 (two) times a day with meals  -     dulaglutide (Trulicity) 1 5 CD/4 9YM injection; Inject 0 5 mL (1 5 mg total) under the skin every 7 days  - microalbumin urine ordered    Primary hypertension  · BP of 145/94 in the office today, pt did not take his losartan prior to his visit today  · Return in months for f/u  If BP still elevated plan to increase losartan to 50 mg qd  Obstructive sleep apnea  Hx of NEFTALY, wife c/o loud snoring and apneic episodes at night  Pt has to sleep with 3 pillows to prevent this from occurring  Also, c/o daytime sleepiness which is concerning as he is a residential [employer]   Sleep study ordered     -     Home Study; Future    Schedule a follow-up appointment in 3 months to f/u on DM and HTN  HEALTH MAINTENANCE     Immunization History   Administered Date(s) Administered   • COVID-19 PFIZER VACCINE 0 3 ML IM 09/26/2021, 11/18/2021   • Tdap 06/25/2009   • Tuberculin Skin Test-PPD Intradermal 12/21/2012, 12/16/2014, 12/22/2016     CHIEF COMPLAINT     Chief Complaint   Patient presents with   • Follow-up      HISTORY OF PRESENT ILLNESS     Marsha Jasmine is a 40 y o  male with a history of HTN, NEFTALY, class III obesity BMI of 41 and T2DM here for T2DM and HTN f/u  Pt recently got a new schedule with [employer] as a   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 5 days a week  Typically sleeping 8 hours a day  When he is driving he snacks on beef jerky, saltine crackers water, strawberry/chocolate milk  Breakfast: bagel and cream cheese from [employer]  For dinner he eats mostly home cooked meals  At night he eats candy cakes, cookies, and PB sticks  Pt notes that he takes his medication 3/7 days a week  He does not exercise outside of work  He had a 5lb weight gain  Pt reports checks his BG 3x per week after dinner and BG is 200-300  He notes polydipsia and polyuria  He denies any chest pain, SOB, palpitations, or neuropathy  Pt in the office today is 145/94, pt did not take his BP medications prior to arrival  + FHx of DM  Pt has not seen an eye doctor in several years, but notes blurred vision with distance  REVIEW OF SYSTEMS     Review of Systems   Constitutional: Negative for activity change, appetite change, chills and fever  HENT: Negative for ear pain, sneezing and sore throat  Eyes: Negative for pain and visual disturbance  Respiratory: Negative for cough, chest tightness and shortness of breath  Cardiovascular: Negative for chest pain and palpitations  Gastrointestinal: Negative for abdominal pain, constipation, diarrhea, nausea and vomiting     Genitourinary: Positive for frequency  Negative for dysuria and hematuria  Musculoskeletal: Negative for arthralgias and back pain  Skin: Negative for color change and rash  Neurological: Negative for dizziness, seizures, syncope, light-headedness and headaches  All other systems reviewed and are negative  OBJECTIVE     Vitals:    02/01/23 0918   BP: 145/94   BP Location: Right arm   Patient Position: Sitting   Cuff Size: Large   Pulse: 65   Temp: 98 7 °F (37 1 °C)   TempSrc: Temporal   SpO2: 95%   Weight: (!) 140 kg (309 lb)   Height: 6' 0 5" (1 842 m)     Physical Exam  Constitutional:       General: He is not in acute distress  Appearance: He is obese  He is not ill-appearing or toxic-appearing  HENT:      Head: Normocephalic and atraumatic  Mouth/Throat:      Mouth: Mucous membranes are moist    Eyes:      General: No scleral icterus  Extraocular Movements: Extraocular movements intact  Conjunctiva/sclera: Conjunctivae normal    Cardiovascular:      Rate and Rhythm: Normal rate and regular rhythm  Pulses: Normal pulses  Dorsalis pedis pulses are 2+ on the right side and 2+ on the left side  Posterior tibial pulses are 2+ on the right side and 2+ on the left side  Heart sounds: Normal heart sounds  Pulmonary:      Effort: Pulmonary effort is normal  No respiratory distress  Breath sounds: Normal breath sounds  No wheezing or rales  Abdominal:      Tenderness: There is no abdominal tenderness  There is no guarding or rebound  Comments: obese   Musculoskeletal:         General: No tenderness  Right lower leg: No edema  Left lower leg: No edema  Feet:      Right foot:      Skin integrity: Dry skin present  No ulcer, skin breakdown, erythema or warmth  Left foot:      Skin integrity: Dry skin present  No ulcer, skin breakdown, erythema or warmth  Skin:     General: Skin is warm and dry  Neurological:      General: No focal deficit present        Mental Status: He is alert and oriented to person, place, and time  Mental status is at baseline  Psychiatric:         Mood and Affect: Mood normal          Behavior: Behavior normal          Thought Content: Thought content normal          Judgment: Judgment normal        CURRENT MEDICATIONS     Current Outpatient Medications:   •  Dulaglutide 0 75 MG/0 5ML SOPN, Inject 0 5 mL (0 75 mg total) under the skin once a week, Disp: 2 mL, Rfl: 3  •  losartan (COZAAR) 25 mg tablet, TAKE 1 TABLET (25 MG TOTAL) BY MOUTH DAILY  , Disp: 90 tablet, Rfl: 3  •  metFORMIN (GLUCOPHAGE-XR) 750 mg 24 hr tablet, TAKE 2 TABLETS WITH DINNER DAILY, Disp: 180 tablet, Rfl: 3  •  OneTouch Delica Lancets 49I MISC, USE AS DIRECTED, Disp: 100 each, Rfl: 10    PAST MEDICAL & SURGICAL HISTORY     Past Medical History:   Diagnosis Date   • Diabetes mellitus ([facility] [address] )    • Foot injury    • Fractures    • Hypertension      Past Surgical History:   Procedure Laterality Date   • TONSILECTOMY AND ADNOIDECTOMY       SOCIAL & FAMILY HISTORY     Social History     Socioeconomic History   • Marital status: Single     Spouse name: Not on file   • Number of children: Not on file   • Years of education: Not on file   • Highest education level: Not on file   Occupational History   • Not on file   Tobacco Use   • Smoking status: Never   • Smokeless tobacco: Never   Vaping Use   • Vaping Use: Former   Substance and Sexual Activity   • Alcohol use: No   • Drug use: Yes     Types: Marijuana     Comment: percocet   • Sexual activity: Not on file   Other Topics Concern   • Not on file   Social History Narrative   • Not on file     Social Determinants of Health     Financial Resource Strain: Low Risk    • Difficulty of Paying Living Expenses: Not hard at all   Food Insecurity: No Food Insecurity   • Worried About Running Out of Food in the Last Year: Never true   • Ran Out of Food in the Last Year: Never true   Transportation Needs: No Transportation Needs   • Lack of Transportation (Medical): No   • Lack of Transportation (Non-Medical): No   Physical Activity: Not on file   Stress: Not on file   Social Connections: Not on file   Intimate Partner Violence: Not on file   Housing Stability: Not on file     Social History     Substance and Sexual Activity   Alcohol Use No     Social History     Substance and Sexual Activity   Drug Use Yes   • Types: Marijuana    Comment: percocet     Social History     Tobacco Use   Smoking Status Never   Smokeless Tobacco Never     History reviewed  No pertinent family history  BMI Counseling: Body mass index is 41 33 kg/m²  The BMI is above normal  Nutrition recommendations include encouraging healthy choices of fruits and vegetables, decreasing fast food intake, consuming healthier snacks, limiting drinks that contain sugar, moderation in carbohydrate intake and increasing intake of lean protein  Exercise recommendations include exercising 3-5 times per week  Rationale for BMI follow-up plan is due to patient being overweight or obese  Depression Screening and Follow-up Plan: Patient was screened for depression during today's encounter  They screened negative with a PHQ-2 score of 0  Diabetic Foot Exam    Patient's shoes and socks removed  Right Foot/Ankle   Right Foot Inspection  Skin Exam: skin normal and dry skin  No warmth, no erythema, no maceration, no abnormal color, no pre-ulcer and no ulcer  Toe Exam: No swelling, no tenderness, erythema and  no right toe deformity    Sensory   Proprioception: intact  Monofilament testing: intact    Vascular  Capillary refills: < 3 seconds  The right DP pulse is 2+  The right PT pulse is 2+  Left Foot/Ankle  Left Foot Inspection  Skin Exam: skin normal and dry skin  No warmth, no erythema, no maceration, normal color, no pre-ulcer and no ulcer  Toe Exam: No swelling, no tenderness, no erythema and no left toe deformity       Sensory   Proprioception: intact  Monofilament testing: intact    Vascular  Capillary refills: < 3 seconds  The left DP pulse is 2+  The left PT pulse is 2+  Assign Risk Category  No deformity present     sensation intact, proprioception intact, 2+ DP/PT pulses  ==  Saima Galvan  Internal Medicine Resident, PGY-1  [facility]  [address]   [facility] , [address] [address] [facility], [address] [facility]  Office: [phone]  Fax: [phone]

## 2023-02-02 ENCOUNTER — TELEPHONE (OUTPATIENT)
Dept: INTERNAL MEDICINE CLINIC | Facility: CLINIC | Age: 38
End: 2023-02-02

## 2023-02-02 NOTE — TELEPHONE ENCOUNTER
Schedule a follow-up appointment in 3 months to f/u on DM and HTN    Seen 2/1/23 by Dr Cira Bonilla    Left message in VM

## 2023-02-27 DIAGNOSIS — E11.9 TYPE 2 DIABETES MELLITUS WITHOUT COMPLICATION, WITHOUT LONG-TERM CURRENT USE OF INSULIN (HCC): Primary | ICD-10-CM

## 2023-04-03 ENCOUNTER — OFFICE VISIT (OUTPATIENT)
Dept: INTERNAL MEDICINE CLINIC | Facility: CLINIC | Age: 38
End: 2023-04-03

## 2023-04-03 VITALS
TEMPERATURE: 98.8 F | OXYGEN SATURATION: 98 % | HEIGHT: 73 IN | DIASTOLIC BLOOD PRESSURE: 108 MMHG | WEIGHT: 307 LBS | HEART RATE: 66 BPM | BODY MASS INDEX: 40.69 KG/M2 | SYSTOLIC BLOOD PRESSURE: 170 MMHG

## 2023-04-03 DIAGNOSIS — N52.9 ERECTILE DISORDER: Primary | ICD-10-CM

## 2023-04-03 RX ORDER — TADALAFIL 5 MG/1
5 TABLET ORAL DAILY PRN
Qty: 10 TABLET | Refills: 0 | Status: SHIPPED | OUTPATIENT
Start: 2023-04-03

## 2023-04-03 NOTE — PATIENT INSTRUCTIONS
Erectile Dysfunction   AMBULATORY CARE:   Erectile dysfunction (ED) , or impotence, is when you cannot get or keep an erection for sexual activity  Call your doctor if:   You have chest pain, dizziness, or nausea after you take ED medicines or during or after sex  You have an erection for more than 4 hours after you take your ED medicine  You see blood in your urine  You have changes in your vision, headaches, or back pain after you take ED medicine  You have a painful erection  You have questions or concerns about your condition or care  Treatment  depends on the cause of your ED  You may need any of the following:  ED medicines  help you have an erection  These medicines are taken before you have sex  Follow instructions on how to use these medicines  You may have a life-threatening reaction if you mix ED medicines with medicines that contain nitrates  Medicines with nitrates include nitroglycerin and other heart medicines  Testosterone  may be given to increase the levels in your blood and improve your ED  You may need to use a skin cream or wear a patch  Testosterone is also given as an injection  Penis injections  may be done to help improve your blood flow  A vacuum device  is a tube that is placed over the penis  A hand pump is connected to the tube and acts as a vacuum  This may help increase blood flow to the penis  Therapy  may be needed to treat emotional or relationship problems that may be causing your ED  Surgery  may be recommended if other treatments do not work  Surgery includes a penile implant or prosthesis  Surgery may also be done to improve blood flow  Ask for more information about surgeries for ED  Decrease your risk for ED:   Do not smoke  Smoking can increase your risk for ED  Nicotine and other chemicals in cigarettes and cigars can also cause lung damage  Ask your healthcare provider for information if you currently smoke and need help to quit  E-cigarettes or smokeless tobacco still contain nicotine  Control your blood sugar levels if you have diabetes  Over time, high blood sugar levels can increase your risk for ED  Limit alcohol  Men should limit alcohol to 2 drinks a day  A drink of alcohol is 12 ounces of beer, 5 ounces of wine, or 1½ ounces of liquor  Manage your medical conditions  Eat a variety of healthy foods and stay physically active  Take your medicines as directed  They can help control conditions that may cause ED  Manage stress  Learn ways to relax, such as deep breathing, meditation, and listening to music  Do not use illegal drugs  They increase your risk for ED  Follow up with your doctor as directed:  Write down your questions so you remember to ask them during your visits  © Copyright Elizebeth [employer]oj[employer] 2022 Information is for End User's use only and may not be sold, redistributed or otherwise used for commercial purposes  The above information is an  only  It is not intended as medical advice for individual conditions or treatments  Talk to your doctor, nurse or pharmacist before following any medical regimen to see if it is safe and effective for you

## 2023-04-03 NOTE — PROGRESS NOTES
Clinic Visit Note  Dyan Shelton 40 y o  male   MRN: [id number]    Assessment and Plan      Diagnoses and all orders for this visit:    Erectile disorder  Patient has had issues with getting and maintaining an erection  Denies any other symptoms  Likely a psychosomatic component, also has uncontrolled T2DM  Stressed the importance of lifestyle modifications  Will trial cialis  -     tadalafil (CIALIS) 5 MG tablet; Take 1 tablet (5 mg total) by mouth daily as needed for erectile dysfunction          Health Maintenance:  PHQ-2/9 Depression Screening        The ASCVD Risk score (Ankur SHEFFIELD, et al , 2019) failed to calculate for the following reasons: The 2019 ASCVD risk score is only valid for ages 36 to 78  Lab Results   Component Value Date    HGBA1C 9 7 (A) 02/01/2023    No results found for: HEPCAB   Most Recent Immunizations   Administered Date(s) Administered   • COVID-19 PFIZER VACCINE 0 3 ML IM 11/18/2021   • Tdap 06/25/2009   • Tuberculin Skin Test-PPD Intradermal 12/22/2016    Not on file Not on file No components found for: HIV1X2     Needs HIV/HCV screening  Labs pending    Schedule a follow-up appointment in 3 months  Chief Complaint: N/V/D  Subjective     History of Present Illness:  Patient is a 40 y o  male with a history of HTN, NEFTALY, class III obesity BMI of 4, and T2DM that presents for erectile disorder  States he has been having problems getting and maintaining an erection  Monogamous with his female partner  Denies other symptoms  HM as above  Diagnoses and all orders for this visit:    Erectile disorder  -     tadalafil (CIALIS) 5 MG tablet; Take 1 tablet (5 mg total) by mouth daily as needed for erectile dysfunction       ---------------------------------------------------------------------------------  Review of Systems   Constitutional: Negative for chills and fever  HENT: Negative for congestion, rhinorrhea, sinus pressure and sinus pain  Respiratory: Negative for cough  Cardiovascular: Negative for chest pain  Gastrointestinal: Negative for abdominal pain, blood in stool, diarrhea and vomiting  Genitourinary: Negative for dysuria  Problems with getting and maintaining an erection   Musculoskeletal: Negative for arthralgias and myalgias  Skin: Negative for rash  Neurological: Negative for dizziness and headaches  Psychiatric/Behavioral: Negative for agitation and confusion  Current Outpatient Medications:   •  dulaglutide (Trulicity) 1 5 OU/3 1KT injection, Inject 0 5 mL (1 5 mg total) under the skin every 7 days, Disp: 0 5 mL, Rfl: 24  •  Lancets (onetouch ultrasoft) lancets, Use as instructed, Disp: 100 each, Rfl: 3  •  losartan (COZAAR) 25 mg tablet, TAKE 1 TABLET (25 MG TOTAL) BY MOUTH DAILY  , Disp: 90 tablet, Rfl: 3  •  metFORMIN (GLUCOPHAGE) 1000 MG tablet, Take 1 tablet (1,000 mg total) by mouth 2 (two) times a day with meals, Disp: 60 tablet, Rfl: 6  •  OneTouch Delica Lancets 42U MISC, USE AS DIRECTED, Disp: 100 each, Rfl: 10  •  tadalafil (CIALIS) 5 MG tablet, Take 1 tablet (5 mg total) by mouth daily as needed for erectile dysfunction, Disp: 10 tablet, Rfl: 0  Past Medical History:   Diagnosis Date   • [facility] ([facility] )    • Foot injury    • Fractures    • Hypertension      Past Surgical History:   Procedure Laterality Date   • TONSILECTOMY AND ADNOIDECTOMY       Social History     Socioeconomic History   • Marital status: Single     Spouse name: Not on file   • Number of children: Not on file   • Years of education: Not on file   • Highest education level: Not on file   Occupational History   • Not on file   Tobacco Use   • Smoking status: Never   • Smokeless tobacco: Never   Vaping Use   • Vaping Use: Former   Substance and Sexual Activity   • Alcohol use: No   • Drug use: Yes     Types: Marijuana     Comment: percocet   • Sexual activity: Not on file   Other Topics Concern   • Not on file   Social History Narrative   • Not on file "    Social Determinants of Health     Financial Resource Strain: Low Risk    • Difficulty of Paying Living Expenses: Not hard at all   Food Insecurity: No Food Insecurity   • Worried About Running Out of Food in the Last Year: Never true   • Ran Out of Food in the Last Year: Never true   Transportation Needs: No Transportation Needs   • Lack of Transportation (Medical): No   • Lack of Transportation (Non-Medical): No   Physical Activity: Not on file   Stress: Not on file   Social Connections: Not on file   Intimate Partner Violence: Not on file   Housing Stability: Not on file     History reviewed  No pertinent family history  No Known Allergies    Objective     Vitals:    04/03/23 1605   BP: 162/93   BP Location: Left arm   Patient Position: Sitting   Cuff Size: Large   Pulse: 71   Temp: 98 8 °F (37 1 °C)   TempSrc: Temporal   SpO2: 98%   Weight: (!) 139 kg (307 lb)   Height: 6' 0 5\" (1 842 m)       Physical exam:     Physical Exam  Vitals reviewed  Constitutional:       Appearance: He is obese  HENT:      Head: Normocephalic and atraumatic  Right Ear: External ear normal       Left Ear: External ear normal       Nose: Nose normal       Mouth/Throat:      Mouth: Mucous membranes are moist       Pharynx: Oropharynx is clear  Eyes:      Extraocular Movements: Extraocular movements intact  Pupils: Pupils are equal, round, and reactive to light  Cardiovascular:      Rate and Rhythm: Normal rate and regular rhythm  Pulses: Normal pulses  Pulmonary:      Effort: Pulmonary effort is normal       Breath sounds: Normal breath sounds  Abdominal:      General: Abdomen is flat  Bowel sounds are normal  There is no distension  Tenderness: There is no abdominal tenderness  Musculoskeletal:      Right lower leg: No edema  Left lower leg: No edema  Skin:     General: Skin is warm and dry  Capillary Refill: Capillary refill takes less than 2 seconds     Neurological:      General: No " focal deficit present  Mental Status: He is alert and oriented to person, place, and time  Psychiatric:         Mood and Affect: Mood normal          Behavior: Behavior normal            ==  PLEASE NOTE:  This encounter was completed utilizing the [employer] Direct Speech Voice Recognition Software  Grammatical errors, random word insertions, pronoun errors and incomplete sentences are occasional consequences of the system due to software limitations, ambient noise and hardware issues  These may be missed by proof reading prior to affixing electronic signature  Any questions or concerns about the content, text or information contained within the body of this dictation should be directly addressed to the physician for clarification  Please do not hesitate to call me directly if you have any any questions or concerns      Jamas Goldberg, DO, [address]  PGY-3, Internal Medicine  [facility]

## 2023-06-29 ENCOUNTER — TELEPHONE (OUTPATIENT)
Dept: INTERNAL MEDICINE CLINIC | Facility: CLINIC | Age: 38
End: 2023-06-29

## 2023-09-05 ENCOUNTER — TELEPHONE (OUTPATIENT)
Dept: INTERNAL MEDICINE CLINIC | Facility: CLINIC | Age: 38
End: 2023-09-05

## 2024-02-20 ENCOUNTER — OFFICE VISIT (OUTPATIENT)
Dept: INTERNAL MEDICINE CLINIC | Facility: CLINIC | Age: 39
End: 2024-02-20

## 2024-02-20 ENCOUNTER — TELEPHONE (OUTPATIENT)
Dept: INTERNAL MEDICINE CLINIC | Facility: CLINIC | Age: 39
End: 2024-02-20

## 2024-02-20 VITALS
TEMPERATURE: 98.4 F | SYSTOLIC BLOOD PRESSURE: 170 MMHG | BODY MASS INDEX: 40.8 KG/M2 | OXYGEN SATURATION: 98 % | HEART RATE: 65 BPM | DIASTOLIC BLOOD PRESSURE: 100 MMHG | WEIGHT: 305 LBS

## 2024-02-20 DIAGNOSIS — I10 PRIMARY HYPERTENSION: ICD-10-CM

## 2024-02-20 DIAGNOSIS — E11.9 TYPE 2 DIABETES MELLITUS WITHOUT COMPLICATION, WITHOUT LONG-TERM CURRENT USE OF INSULIN (HCC): Primary | ICD-10-CM

## 2024-02-20 DIAGNOSIS — N52.9 ERECTILE DISORDER: ICD-10-CM

## 2024-02-20 DIAGNOSIS — Z00.00 ANNUAL PHYSICAL EXAM: ICD-10-CM

## 2024-02-20 LAB — SL AMB POCT HEMOGLOBIN AIC: 10 (ref ?–6.5)

## 2024-02-20 RX ORDER — GLUCOSAMINE HCL/CHONDROITIN SU 500-400 MG
CAPSULE ORAL
Qty: 300 EACH | Refills: 3 | Status: SHIPPED | OUTPATIENT
Start: 2024-02-20

## 2024-02-20 RX ORDER — LOSARTAN POTASSIUM 25 MG/1
25 TABLET ORAL DAILY
Qty: 90 TABLET | Refills: 3 | Status: SHIPPED | OUTPATIENT
Start: 2024-02-20 | End: 2025-02-14

## 2024-02-20 RX ORDER — BLOOD-GLUCOSE METER
EACH MISCELLANEOUS
Qty: 1 KIT | Refills: 0 | Status: SHIPPED | OUTPATIENT
Start: 2024-02-20

## 2024-02-20 RX ORDER — LANCETS 33 GAUGE
EACH MISCELLANEOUS
Qty: 300 EACH | Refills: 3 | Status: SHIPPED | OUTPATIENT
Start: 2024-02-20

## 2024-02-20 RX ORDER — AMLODIPINE BESYLATE 5 MG/1
5 TABLET ORAL DAILY
Qty: 90 TABLET | Refills: 3 | Status: SHIPPED | OUTPATIENT
Start: 2024-02-20 | End: 2025-02-14

## 2024-02-20 RX ORDER — BLOOD SUGAR DIAGNOSTIC
STRIP MISCELLANEOUS
Qty: 300 EACH | Refills: 3 | Status: SHIPPED | OUTPATIENT
Start: 2024-02-20

## 2024-02-20 RX ORDER — SILDENAFIL 25 MG/1
25 TABLET, FILM COATED ORAL DAILY PRN
Qty: 30 TABLET | Refills: 0 | Status: SHIPPED | OUTPATIENT
Start: 2024-02-20

## 2024-02-20 RX ORDER — LISINOPRIL 10 MG/1
10 TABLET ORAL DAILY
Qty: 90 TABLET | Refills: 0 | Status: CANCELLED | OUTPATIENT
Start: 2024-02-20 | End: 2024-05-20

## 2024-02-20 NOTE — PROGRESS NOTES
ADULT ANNUAL PHYSICAL  [facility]    NAME: Rex Bradshaw  AGE: 38 y.o. SEX: male  : 1985     DATE: 2024     Assessment and Plan:     Problem List Items Addressed This Visit     Type 2 diabetes mellitus (HCC) - Primary    Relevant Medications    losartan (COZAAR) 25 mg tablet    metFORMIN (GLUCOPHAGE) 1000 MG tablet    dulaglutide (Trulicity) 1.5 MG/0.5ML injection    Blood Glucose Monitoring Suppl (ONE TOUCH ULTRA 2) w/Device KIT    glucose blood (OneTouch Ultra) test strip    OneTouch Delica Lancets 33G MISC    Alcohol Swabs 70 % PADS    Other Relevant Orders    POCT hemoglobin A1c (Completed)    Hepatitis C antibody    IRIS Diabetic eye exam    HIV 1/2 AG/AB w Reflex [facility] for 2 yr old and above    Albumin / creatinine urine ratio    Ambulatory Referral to Diabetic Education    Lipid panel    Primary hypertension    Relevant Medications    amLODIPine (NORVASC) 5 mg tablet    losartan (COZAAR) 25 mg tablet   Other Visit Diagnoses     Annual physical exam        Relevant Orders    CBC and differential    Comprehensive metabolic panel    Erectile disorder        Relevant Medications    sildenafil (VIAGRA) 25 MG tablet      DM2: uncontrolled DM2 with loss to follow up and discontinuing meds due to poor health literacy. Patient does want to work on controlling his DM2, but does not understand well the disease or what kind of lifestyle changes it requires. Will recontinue metformin 1000 mg BID, trulicity 1.5 mg weekly. A1c 10 from 9.7. If A1c is still elevated -- low threshold to start him on insulin long acting additionally. Will send to diabetes education. Needs close q3 month follow up for DM2, until better controlled. Discussed poor control of diabetes can not only cause kidney, eye, and peripheral nerve injury but also can cause sexual dysfunction (as patient experiencing ED).     HTN: hypertensive to /100. Self discontinued  losartan. Restart losartan 25 mg and amlodipine 5 mg daily. Advised weight loss.       Immunizations and preventive care screenings were discussed with patient today. Appropriate education was printed on patient's after visit summary.    Counseling:  Dental Health: discussed importance of regular tooth brushing, flossing, and dental visits.  Exercise: the importance of regular exercise/physical activity was discussed. Recommend exercise 3-5 times per week for at least 30 minutes.          Return in about 8 years (around 2/20/2032) for DM, htn, labswork.     Chief Complaint:     Chief Complaint   Patient presents with   • Physical Exam     Has paperwork      History of Present Illness:     Adult Annual Physical   Patient here for a comprehensive physical exam. The patient reports no problems.    HTN: 170/108 4/2023 and 170/100 today. Losartan stopped taking in 25 mg daily 2 months ago.     Diabetes: 1 year ago last visit, patient started on metformin 750 mg qd ->1000 bid and trulicity increased from 0.75 to 1.5 weekly on 2/1/23. Metformin stopped taking 2 months ago.  Stopped trulicity 2 months ago.  Dennard from chris and gatorade. Skips lunch. Dinner is rice and beans or mashed potato and steak. Breakfast and dinner are the only. Never got diabetic education. Is a .     Diet and Physical Activity  Diet/Nutrition: poor diet.   Exercise: no formal exercise and work lifting a lot of boxes .      Depression Screening  PHQ-2/9 Depression Screening    Little interest or pleasure in doing things: 0 - not at all  Feeling down, depressed, or hopeless: 0 - not at all  PHQ-2 Score: 0  PHQ-2 Interpretation: Negative depression screen       General Health  Sleep: sleeps well and gets 7-8 hours of sleep on average. Wife doesn't mention him stopping breathing.   Hearing: normal - bilateral.  Vision: no vision problems.   Dental: no dental visits for >1 year.        Health  History of STDs?: no.    Advanced Care  Planning  Do you have an advanced directive? no  Do you have a durable medical power of ? no  ACP document given to the patient? no     Review of Systems:     Review of Systems   Constitutional:  Negative for chills and fever.   HENT:  Negative for ear pain and sore throat.    Eyes:  Negative for pain and visual disturbance.   Respiratory:  Negative for cough and shortness of breath.    Cardiovascular:  Negative for chest pain and palpitations.   Gastrointestinal:  Negative for abdominal pain and vomiting.   Endocrine: Positive for polydipsia and polyuria. Negative for polyphagia.   Genitourinary:  Negative for dysuria and hematuria.        ED   Musculoskeletal:  Negative for arthralgias and back pain.   Skin:  Negative for color change and rash.   Neurological:  Negative for seizures and syncope.   All other systems reviewed and are negative.     Past Medical History:     Past Medical History:   Diagnosis Date   • Diabetes mellitus (HCC)    • Foot injury    • Fractures    • Hypertension       Past Surgical History:     Past Surgical History:   Procedure Laterality Date   • TONSILECTOMY AND ADNOIDECTOMY        Social History:     Social History     Socioeconomic History   • Marital status: Single     Spouse name: None   • Number of children: None   • Years of education: None   • Highest education level: None   Occupational History   • None   Tobacco Use   • Smoking status: Never   • Smokeless tobacco: Never   Vaping Use   • Vaping status: Former   Substance and Sexual Activity   • Alcohol use: No   • Drug use: Yes     Types: Marijuana     Comment: percocet   • Sexual activity: None   Other Topics Concern   • None   Social History Narrative   • None     Social Determinants of Health     Financial Resource Strain: Low Risk  (2/20/2024)    Overall Financial Resource Strain (CARDIA)    • Difficulty of Paying Living Expenses: Not hard at all   Food Insecurity: No Food Insecurity (2/20/2024)    Hunger Vital Sign     • Worried About Running Out of Food in the Last Year: Never true    • Ran Out of Food in the Last Year: Never true   Transportation Needs: No Transportation Needs (2/20/2024)    PRAPARE - Transportation    • Lack of Transportation (Medical): No    • Lack of Transportation (Non-Medical): No   Physical Activity: Sufficiently Active (1/26/2022)    Exercise Vital Sign    • Days of Exercise per Week: 6 days    • Minutes of Exercise per Session: 60 min   Stress: No Stress Concern Present (1/26/2022)    [employer] - Occupational Stress Questionnaire    • Feeling of Stress : Not at all   Social Connections: Socially Isolated (1/26/2022)    Social Connection and Isolation Panel [NHANES]    • Frequency of Communication with Friends and Family: More than three times a week    • Frequency of Social Gatherings with Friends and Family: More than three times a week    • Attends Jew Services: Never    • Active Member of Clubs or Organizations: No    • Attends Club or Organization Meetings: Never    • Marital Status: Never    Intimate Partner Violence: Not on file   Housing Stability: Low Risk  (1/26/2022)    Housing Stability Vital Sign    • Unable to Pay for Housing in the Last Year: No    • Number of Places Lived in the Last Year: 1    • Unstable Housing in the Last Year: No      Family History:     History reviewed. No pertinent family history.   Current Medications:     Current Outpatient Medications   Medication Sig Dispense Refill   • Alcohol Swabs 70 % PADS Check blood sugars three times daily 300 each 3   • amLODIPine (NORVASC) 5 mg tablet Take 1 tablet (5 mg total) by mouth daily 90 tablet 3   • Blood Glucose Monitoring Suppl (ONE TOUCH ULTRA 2) w/Device KIT Check blood sugars three times daily. Please substitute with appropriate alternative as covered by patient's insurance. Dx: E11.65 1 kit 0   • dulaglutide (Trulicity) 1.5 MG/0.5ML injection Inject 0.5 mL (1.5 mg total)  under the skin every 7 days 0.5 mL 24   • glucose blood (OneTouch Ultra) test strip Check blood sugars three times daily. Please substitute with appropriate alternative as covered by patient's insurance. Dx: E11.65 300 each 3   • losartan (COZAAR) 25 mg tablet Take 1 tablet (25 mg total) by mouth daily 90 tablet 3   • metFORMIN (GLUCOPHAGE) 1000 MG tablet Take 1 tablet (1,000 mg total) by mouth 2 (two) times a day with meals 60 tablet 6   • OneTouch Delica Lancets 33G MISC Check blood sugars three times daily. Please substitute with appropriate alternative as covered by patient's insurance. Dx: E11.65 300 each 3   • sildenafil (VIAGRA) 25 MG tablet Take 1 tablet (25 mg total) by mouth daily as needed for erectile dysfunction 30 tablet 0     No current facility-administered medications for this visit.      Allergies:     No Known Allergies   Physical Exam:     /100 (BP Location: Left arm, Patient Position: Sitting, Cuff Size: Large)   Pulse 65   Temp 98.4 °F (36.9 °C) (Temporal)   Wt (!) 138 kg (305 lb)   SpO2 98%   BMI 40.80 kg/m²     Physical Exam  Vitals and nursing note reviewed.   Constitutional:       General: He is not in acute distress.     Appearance: He is well-developed.   HENT:      Head: Normocephalic and atraumatic.   Eyes:      Conjunctiva/sclera: Conjunctivae normal.   Cardiovascular:      Rate and Rhythm: Normal rate and regular rhythm.      Pulses: no weak pulses.      Heart sounds: No murmur heard.  Pulmonary:      Effort: Pulmonary effort is normal. No respiratory distress.      Breath sounds: Normal breath sounds.   Abdominal:      Palpations: Abdomen is soft.      Tenderness: There is no abdominal tenderness.   Musculoskeletal:         General: No swelling.      Cervical back: Neck supple.   Feet:      Right foot:      Skin integrity: Skin breakdown and callus present. No ulcer, erythema, warmth or dry skin.      Left foot:      Skin integrity: Skin breakdown and callus present.    Skin:     General: Skin is warm and dry.      Capillary Refill: Capillary refill takes less than 2 seconds.      Comments: Callused foot, interdigital maceration in b/l feet   Neurological:      Mental Status: He is alert and oriented to person, place, and time.   Psychiatric:         Mood and Affect: Mood normal.      Diabetic Foot Exam    Patient's shoes and socks removed.    Right Foot/Ankle   Right Foot Inspection  Skin Exam: callus, maceration and callus. Skin not intact, no dry skin, no warmth, no erythema, no pre-ulcer and no ulcer.     Sensory   Proprioception: intact  Monofilament testing: intact    Left Foot/Ankle  Left Foot Inspection  Skin Exam: maceration and callus. Skin not intact.     Sensory   Proprioception: intact  Monofilament testing: intact    Assign Risk Category  No deformity present  No loss of protective sensation  No weak pulses  Risk: 0      Emilie Soyeon Kim, MD   [facility]

## 2024-02-20 NOTE — TELEPHONE ENCOUNTER
Folder Color: blue     Name of Form: medical response     Form to be filled out by: Dr Felton     Form to be Faxed: given to pt     Patient made aware of 10 business day policy.

## 2024-02-28 ENCOUNTER — TELEPHONE (OUTPATIENT)
Dept: DIABETES SERVICES | Facility: OTHER | Age: 39
End: 2024-02-28

## 2024-02-28 NOTE — TELEPHONE ENCOUNTER
Pt referred again for diabetes education.  RD has attempted several times to connect with patient in the past regarding the referral.  Left a voicemail for pt regarding newly placed referral for diabetes education.

## 2024-03-13 DIAGNOSIS — E11.9 TYPE 2 DIABETES MELLITUS WITHOUT COMPLICATION, WITHOUT LONG-TERM CURRENT USE OF INSULIN (HCC): ICD-10-CM

## 2024-04-22 DIAGNOSIS — N52.9 ERECTILE DISORDER: ICD-10-CM

## 2024-04-24 RX ORDER — SILDENAFIL 25 MG/1
TABLET, FILM COATED ORAL
Qty: 30 TABLET | Refills: 0 | Status: SHIPPED | OUTPATIENT
Start: 2024-04-24

## 2024-05-25 DIAGNOSIS — N52.9 ERECTILE DISORDER: ICD-10-CM

## 2024-05-28 RX ORDER — SILDENAFIL 25 MG/1
TABLET, FILM COATED ORAL
Qty: 30 TABLET | Refills: 0 | Status: SHIPPED | OUTPATIENT
Start: 2024-05-28 | End: 2024-05-30

## 2024-05-30 DIAGNOSIS — N52.9 ERECTILE DISORDER: ICD-10-CM

## 2024-05-30 RX ORDER — SILDENAFIL 25 MG/1
TABLET, FILM COATED ORAL
Qty: 30 TABLET | Refills: 0 | Status: SHIPPED | OUTPATIENT
Start: 2024-05-30

## 2024-06-05 ENCOUNTER — TELEPHONE (OUTPATIENT)
Dept: INTERNAL MEDICINE CLINIC | Facility: CLINIC | Age: 39
End: 2024-06-05

## 2024-06-05 NOTE — TELEPHONE ENCOUNTER
Received request for prior authorization for Trulicity 1.5 mg/0.5 ml injection.     Prior authorization initiated on Cover my Meds. Pending review.     Key:[id number]

## 2024-06-11 NOTE — TELEPHONE ENCOUNTER
Prior auth approved for a copay of $25, I called and left detailed VM advising patient of this and to call with any questions.

## 2024-07-03 DIAGNOSIS — N52.9 ERECTILE DISORDER: ICD-10-CM

## 2024-07-05 RX ORDER — SILDENAFIL 25 MG/1
TABLET, FILM COATED ORAL
Qty: 30 TABLET | Refills: 0 | Status: SHIPPED | OUTPATIENT
Start: 2024-07-05

## 2024-07-10 ENCOUNTER — HOSPITAL ENCOUNTER (EMERGENCY)
Facility: HOSPITAL | Age: 39
Discharge: HOME | End: 2024-07-11
Attending: EMERGENCY MEDICINE
Payer: COMMERCIAL

## 2024-07-10 ENCOUNTER — APPOINTMENT (EMERGENCY)
Dept: RADIOLOGY | Facility: HOSPITAL | Age: 39
End: 2024-07-10
Payer: COMMERCIAL

## 2024-07-10 VITALS
BODY MASS INDEX: 40.76 KG/M2 | DIASTOLIC BLOOD PRESSURE: 100 MMHG | WEIGHT: 300.93 LBS | SYSTOLIC BLOOD PRESSURE: 173 MMHG | RESPIRATION RATE: 20 BRPM | HEART RATE: 80 BPM | TEMPERATURE: 97.6 F | OXYGEN SATURATION: 95 % | HEIGHT: 72 IN

## 2024-07-10 DIAGNOSIS — R42 LIGHTHEADED: ICD-10-CM

## 2024-07-10 DIAGNOSIS — E11.9 DIABETES (HCC): Primary | ICD-10-CM

## 2024-07-10 DIAGNOSIS — R73.09 HEMOGLOBIN A1C GREATER THAN 9.0%: ICD-10-CM

## 2024-07-10 DIAGNOSIS — R11.2 NAUSEA & VOMITING: ICD-10-CM

## 2024-07-10 LAB
ALBUMIN SERPL BCG-MCNC: 3.9 G/DL (ref 3.5–5)
ALP SERPL-CCNC: 72 U/L (ref 34–104)
ALT SERPL W P-5'-P-CCNC: 60 U/L (ref 7–52)
ANION GAP SERPL CALCULATED.3IONS-SCNC: 8 MMOL/L (ref 4–13)
AST SERPL W P-5'-P-CCNC: 31 U/L (ref 13–39)
B-OH-BUTYR SERPL-MCNC: 0.08 MMOL/L (ref 0.02–0.27)
BASE EX.OXY STD BLDV CALC-SCNC: 74.4 % (ref 60–80)
BASE EXCESS BLDV CALC-SCNC: 2.5 MMOL/L
BILIRUB SERPL-MCNC: 0.61 MG/DL (ref 0.2–1)
BUN SERPL-MCNC: 16 MG/DL (ref 5–25)
CALCIUM SERPL-MCNC: 8.4 MG/DL (ref 8.4–10.2)
CARDIAC TROPONIN I PNL SERPL HS: 4 NG/L
CHLORIDE SERPL-SCNC: 99 MMOL/L (ref 96–108)
CO2 SERPL-SCNC: 30 MMOL/L (ref 21–32)
CREAT SERPL-MCNC: 0.78 MG/DL (ref 0.6–1.3)
ERYTHROCYTE [DISTWIDTH] IN BLOOD BY AUTOMATED COUNT: 11.2 % (ref 11.6–15.1)
GFR SERPL CREATININE-BSD FRML MDRD: 114 ML/MIN/1.73SQ M
GLUCOSE SERPL-MCNC: 256 MG/DL (ref 65–140)
GLUCOSE SERPL-MCNC: 280 MG/DL (ref 65–140)
HCO3 BLDV-SCNC: 27.8 MMOL/L (ref 24–30)
HCT VFR BLD AUTO: 44.7 % (ref 36.5–49.3)
HGB BLD-MCNC: 15 G/DL (ref 12–17)
MCH RBC QN AUTO: 29.2 PG (ref 26.8–34.3)
MCHC RBC AUTO-ENTMCNC: 33.6 G/DL (ref 31.4–37.4)
MCV RBC AUTO: 87 FL (ref 82–98)
O2 CT BLDV-SCNC: 16.8 ML/DL
PCO2 BLDV: 45 MM HG (ref 42–50)
PH BLDV: 7.41 [PH] (ref 7.3–7.4)
PLATELET # BLD AUTO: 298 THOUSANDS/UL (ref 149–390)
PMV BLD AUTO: 10.6 FL (ref 8.9–12.7)
PO2 BLDV: 40.4 MM HG (ref 35–45)
POTASSIUM SERPL-SCNC: 4.1 MMOL/L (ref 3.5–5.3)
PROT SERPL-MCNC: 7.5 G/DL (ref 6.4–8.4)
RBC # BLD AUTO: 5.14 MILLION/UL (ref 3.88–5.62)
SODIUM SERPL-SCNC: 137 MMOL/L (ref 135–147)
WBC # BLD AUTO: 10.59 THOUSAND/UL (ref 4.31–10.16)

## 2024-07-10 PROCEDURE — 80053 COMPREHEN METABOLIC PANEL: CPT

## 2024-07-10 PROCEDURE — 84484 ASSAY OF TROPONIN QUANT: CPT

## 2024-07-10 PROCEDURE — 99285 EMERGENCY DEPT VISIT HI MDM: CPT

## 2024-07-10 PROCEDURE — 82948 REAGENT STRIP/BLOOD GLUCOSE: CPT

## 2024-07-10 PROCEDURE — 93005 ELECTROCARDIOGRAM TRACING: CPT

## 2024-07-10 PROCEDURE — 99285 EMERGENCY DEPT VISIT HI MDM: CPT | Performed by: EMERGENCY MEDICINE

## 2024-07-10 PROCEDURE — 71046 X-RAY EXAM CHEST 2 VIEWS: CPT

## 2024-07-10 PROCEDURE — 36415 COLL VENOUS BLD VENIPUNCTURE: CPT

## 2024-07-10 PROCEDURE — 82010 KETONE BODYS QUAN: CPT

## 2024-07-10 PROCEDURE — 82805 BLOOD GASES W/O2 SATURATION: CPT

## 2024-07-10 PROCEDURE — 96360 HYDRATION IV INFUSION INIT: CPT

## 2024-07-10 PROCEDURE — 83036 HEMOGLOBIN GLYCOSYLATED A1C: CPT | Performed by: EMERGENCY MEDICINE

## 2024-07-10 PROCEDURE — 85027 COMPLETE CBC AUTOMATED: CPT

## 2024-07-10 RX ADMIN — SODIUM CHLORIDE 1000 ML: 0.9 INJECTION, SOLUTION INTRAVENOUS at 23:49

## 2024-07-10 NOTE — Clinical Note
Rex Bradshaw was seen and treated in our emergency department on 7/10/2024.    No restrictions            Diagnosis:     Rex  may return to work on return date.    He may return on this date: 07/12/2024         If you have any questions or concerns, please don't hesitate to call.      Helena Cottrell, DO    ______________________________           _______________          _______________  Hospital Representative                              Date                                Time

## 2024-07-11 LAB
ATRIAL RATE: 65 BPM
EST. AVERAGE GLUCOSE BLD GHB EST-MCNC: 289 MG/DL
HBA1C MFR BLD: 11.7 %
P AXIS: 45 DEGREES
PR INTERVAL: 150 MS
QRS AXIS: 40 DEGREES
QRSD INTERVAL: 86 MS
QT INTERVAL: 392 MS
QTC INTERVAL: 407 MS
T WAVE AXIS: 16 DEGREES
VENTRICULAR RATE: 65 BPM

## 2024-07-11 PROCEDURE — 93010 ELECTROCARDIOGRAM REPORT: CPT | Performed by: INTERNAL MEDICINE

## 2024-07-11 PROCEDURE — 96361 HYDRATE IV INFUSION ADD-ON: CPT

## 2024-07-11 NOTE — ED PROVIDER NOTES
History  Chief Complaint   Patient presents with    Hyperglycemia - Symptomatic     Pt states he took blood sugar at home and sugar was 499. Pt has been vomiting, sweating, and burping since this morning.      HPI  Patient is 38-year-old male with poorly controlled type 2 diabetes presenting to ED for evaluation of hyperglycemia.  Patient reports has had intermittently high sugars throughout the day hide is 499.  Patient also reports polyuria, polydipsia, nausea and vomiting since yesterday.  Also reports generalized lightheadedness and diaphoresis today.  Patient reports consistently takes Trulicity but intermittently takes metformin, often skipping a week of medication.  Patient denies fever, chills, chest pain, shortness of breath, diarrhea.  Prior to Admission Medications   Prescriptions Last Dose Informant Patient Reported? Taking?   Alcohol Swabs 70 % PADS   No No   Sig: Check blood sugars three times daily   Blood Glucose Monitoring Suppl (ONE TOUCH ULTRA 2) w/Device KIT   No No   Sig: Check blood sugars three times daily. Please substitute with appropriate alternative as covered by patient's insurance. Dx: E11.65   OneTouch Delica Lancets 33G MISC   No No   Sig: Check blood sugars three times daily. Please substitute with appropriate alternative as covered by patient's insurance. Dx: E11.65   amLODIPine (NORVASC) 5 mg tablet   No No   Sig: Take 1 tablet (5 mg total) by mouth daily   dulaglutide (Trulicity) 1.5 MG/0.5ML injection   No No   Sig: Inject 0.5 mL (1.5 mg total) under the skin every 7 days   glucose blood (OneTouch Ultra) test strip   No No   Sig: Check blood sugars three times daily. Please substitute with appropriate alternative as covered by patient's insurance. Dx: E11.65   losartan (COZAAR) 25 mg tablet   No No   Sig: Take 1 tablet (25 mg total) by mouth daily   metFORMIN (GLUCOPHAGE) 1000 MG tablet   No No   Sig: TAKE 1 TABLET BY MOUTH TWICE A DAY WITH MEALS   sildenafil (VIAGRA) 25 MG tablet    No No   Sig: TAKE 1 TABLET BY MOUTH DAILY AS NEEDED FOR ERECTILE DYSFUNCTION.      Facility-Administered Medications: None       Past Medical History:   Diagnosis Date    Diabetes mellitus (HCC)     Foot injury     Fractures     Hypertension        Past Surgical History:   Procedure Laterality Date    TONSILECTOMY AND ADNOIDECTOMY         History reviewed. No pertinent family history.  I have reviewed and agree with the history as documented.    E-Cigarette/Vaping    E-Cigarette Use Former User      E-Cigarette/Vaping Substances    Nicotine No     THC No     CBD No     Flavoring No     Other No     Unknown No      Social History     Tobacco Use    Smoking status: Never    Smokeless tobacco: Never   Vaping Use    Vaping status: Former   Substance Use Topics    Alcohol use: No    Drug use: Yes     Types: Marijuana     Comment: percocet        Review of Systems   Constitutional:  Positive for diaphoresis. Negative for chills and fever.   HENT:  Negative for ear pain and sore throat.    Respiratory:  Negative for cough and shortness of breath.    Cardiovascular:  Negative for chest pain, palpitations and leg swelling.   Gastrointestinal:  Positive for nausea and vomiting. Negative for abdominal pain and diarrhea.   Endocrine: Positive for polydipsia and polyuria.   Genitourinary:  Negative for dysuria, frequency and hematuria.   Musculoskeletal:  Negative for back pain and neck pain.   Skin:  Negative for rash.   Neurological:  Positive for light-headedness. Negative for dizziness and headaches.       Physical Exam  ED Triage Vitals [07/10/24 2216]   Temperature Pulse Respirations Blood Pressure SpO2   97.6 °F (36.4 °C) 80 20 (!) 173/100 95 %      Temp Source Heart Rate Source Patient Position - Orthostatic VS BP Location FiO2 (%)   Temporal Monitor -- Left arm --      Pain Score       --             Orthostatic Vital Signs  Vitals:    07/10/24 2216   BP: (!) 173/100   Pulse: 80       Physical Exam  Vitals reviewed.    Constitutional:       General: He is awake.   HENT:      Head: Normocephalic and atraumatic.      Mouth/Throat:      Mouth: Mucous membranes are moist.   Eyes:      Extraocular Movements: Extraocular movements intact.      Right eye: No nystagmus.      Left eye: No nystagmus.      Conjunctiva/sclera: Conjunctivae normal.      Pupils: Pupils are equal, round, and reactive to light.   Cardiovascular:      Rate and Rhythm: Normal rate and regular rhythm.      Pulses: Normal pulses.      Heart sounds: Normal heart sounds, S1 normal and S2 normal. Heart sounds not distant. No murmur heard.     No friction rub. No gallop.   Pulmonary:      Breath sounds: No stridor. No wheezing, rhonchi or rales.      Comments: CTA b/l   Abdominal:      General: Bowel sounds are normal.      Palpations: Abdomen is soft.      Tenderness: There is no abdominal tenderness.      Comments: Obese abdomen   Musculoskeletal:      Right lower leg: No edema.      Left lower leg: No edema.   Skin:     General: Skin is warm and dry.      Capillary Refill: Capillary refill takes less than 2 seconds.   Neurological:      Mental Status: He is alert and oriented to person, place, and time.      GCS: GCS eye subscore is 4. GCS verbal subscore is 5. GCS motor subscore is 6.      Cranial Nerves: Cranial nerves 2-12 are intact.      Sensory: Sensation is intact.      Motor: No weakness or pronator drift.      Coordination: Coordination normal. Finger-Nose-Finger Test normal.         ED Medications  Medications   sodium chloride 0.9 % bolus 1,000 mL (0 mL Intravenous Stopped 7/11/24 0128)       Diagnostic Studies  Results Reviewed       Procedure Component Value Units Date/Time    Hemoglobin A1C [[id number]]  (Abnormal) Collected: 07/10/24 2305    Lab Status: Final result Specimen: Blood from Arm, Left Updated: 07/11/24 0021     Hemoglobin A1C 11.7 %       mg/dl     HS Troponin 0hr (reflex protocol) [[id number]]  (Normal) Collected: 07/10/24 2305     Lab Status: Final result Specimen: Blood from Arm, Left Updated: 07/10/24 2347     hs TnI 0hr 4 ng/L     Comprehensive metabolic panel [[id number]]  (Abnormal) Collected: 07/10/24 2305    Lab Status: Final result Specimen: Blood from Arm, Left Updated: 07/10/24 2342     Sodium 137 mmol/L      Potassium 4.1 mmol/L      Chloride 99 mmol/L      CO2 30 mmol/L      ANION GAP 8 mmol/L      BUN 16 mg/dL      Creatinine 0.78 mg/dL      Glucose 280 mg/dL      Calcium 8.4 mg/dL      AST 31 U/L      ALT 60 U/L      Alkaline Phosphatase 72 U/L      Total Protein 7.5 g/dL      Albumin 3.9 g/dL      Total Bilirubin 0.61 mg/dL      eGFR 114 ml/min/1.73sq m     Narrative:      National Kid[employer] Disease Foundation guidelines for Chronic Kidney Disease (CKD):     Stage 1 with normal or high GFR (GFR > 90 mL/min/1.73 square meters)    Stage 2 Mild CKD (GFR = 60-89 mL/min/1.73 square meters)    Stage 3A Moderate CKD (GFR = 45-59 mL/min/1.73 square meters)    Stage 3B Moderate CKD (GFR = 30-44 mL/min/1.73 square meters)    Stage 4 Severe CKD (GFR = 15-29 mL/min/1.73 square meters)    Stage 5 End Stage CKD (GFR <15 mL/min/1.73 square meters)  Note: GFR calculation is accurate only with a steady state creatinine    Beta Hydroxybutyrate [[id number]]  (Normal) Collected: 07/10/24 2305    Lab Status: Final result Specimen: Blood from Arm, Left Updated: 07/10/24 2342     Beta- Hydroxybutyrate 0.08 mmol/L     CBC [[id number]]  (Abnormal) Collected: 07/10/24 2305    Lab Status: Final result Specimen: Blood from Arm, Left Updated: 07/10/24 2330     WBC 10.59 Thousand/uL      RBC 5.14 Million/uL      Hemoglobin 15.0 g/dL      Hematocrit 44.7 %      MCV 87 fL      MCH 29.2 pg      MCHC 33.6 g/dL      RDW 11.2 %      Platelets 298 Thousands/uL      MPV 10.6 fL     Blood gas, venous [[id number]]  (Abnormal) Collected: 07/10/24 2305    Lab Status: Final result Specimen: Blood from Arm, Left Updated: 07/10/24 2322     pH, Adrian 7.408     pCO2, Adrian 45.0 mm Hg       pO2, Adrian 40.4 mm Hg      HCO3, Adrian 27.8 mmol/L      Base Excess, Adrian 2.5 mmol/L      O2 Content, Adrian 16.8 ml/dL      O2 HGB, VENOUS 74.4 %     Fingerstick Glucose (POCT) [[id number]]  (Abnormal) Collected: 07/10/24 2215    Lab Status: Final result Specimen: Blood Updated: 07/10/24 2216     POC Glucose 256 mg/dl                    XR chest 2 views   ED Interpretation by Helena Cottrell DO (07/11 0057)   No acute cardiopulmonary abnormality             Procedures  Procedures      ED Course  ED Course as of 07/11/24 0358   Wed Jul 10, 2024   2331 pH, Adrian(!): 7.408   2340 WBC(!): 10.59   2340 Hemoglobin: 15.0   2340 Platelet Count: 298   2346 GLUCOSE(!): 280   2346 ANION GAP: 8   Thu Jul 11, 2024   0017 hs TnI 0hr: 4   0017 Beta- Hydroxybutyrate: 0.08   0017 Comprehensive metabolic panel(!)  Unremarkable    0027 Hemoglobin A1C(!): 11.7  Increased from 10 in Feb 2024                                       Medical Decision Making  Amount and/or Complexity of Data Reviewed  Labs: ordered. Decision-making details documented in ED Course.  Radiology: ordered and independent interpretation performed.      Patient is 38 y.o. male with PMH of poorly controlled type 2 diabetes who presents to the ED with hyperglycemia. See history and physical documented above.       Differential diagnosis included but not limited to dehydration, ACS, arrhythmia, gastritis, DKA, HHNK, electrolyte disturbance, anemia. Plan cardiac workup, DKA workup.  Will include hemoglobin A1c was in February 2024 and was 10.  Will give IV fluids.    View ED course above for further discussion on patient workup.     On review of previous records PCP outpatient note notes poorly controlled diabetic.  Last hemoglobin A1c 10.  PCP currently giving patient additional trial of metformin and Trulicity, if fails to control A1c plan to put on insulin..    All labs reviewed and utilized in the medical decision making process  All radiology studies independently  viewed by me and interpreted by the radiologist.  I reviewed all testing with the patient.     Upon re-evaluation patient feeling better.  Discussed all lab results and need for outpatient follow-up for diabetic medication management.  Patient amenable to plan..    I have reviewed the patient's vital signs, nursing notes, and other relevant tests/information. I had a detailed discussion with the patient regarding the history, exam findings, and any diagnostic results.   Plan to discharge home in stable condition with DM, A1C>9, n/v, follow up with PCP.  Discussed with patient who is agreeable to plan.  I discussed discharge instructions, need for follow-up, and oral return precautions for what to return for in addition to the written return precautions and discharge instructions, specifically highlighting areas of special concern.  The patient verbalized understanding of the discharge instructions and warnings that would necessitate return to the Emergency Department including severe abdominal pain, persistent nausea vomiting, chest pain, shortness of breath  All questions the patient had were answered prior to discharge to the best of my ability.         Disposition  Final diagnoses:   Diabetes (HCC)   Hemoglobin A1c greater than 9.0%   Nausea & vomiting   Lightheaded     Time reflects when diagnosis was documented in both MDM as applicable and the Disposition within this note       Time User Action Codes Description Comment    7/11/2024  1:03 AM Helena Cottrell [E11.9] Diabetes (HCC)     7/11/2024  1:03 AM Helena Cottrell [R73.09] Hemoglobin A1c greater than 9.0%     7/11/2024  1:12 AM Helena Cottrell [R11.2] Nausea & vomiting     7/11/2024  1:12 AM Helena Cottrell [R42] Lightheaded           ED Disposition       ED Disposition   Discharge    Condition   Stable    Date/Time   Thu Jul 11, 2024 12:58 AM    Comment   Rex Bradshaw discharge to home/self care.                   Follow-up Information        Follow up With Specialties Details Why Contact Info Additional Information    [facility] Family Medicine Call   [address]  [facility] [address] [phone] [facility], [address], [place], [address] [phone]    [facility] Emergency Department Emergency Medicine Go to  If symptoms worsen [address] [facility] [place] [facility] [address]  [phone] [facility], [address], [place], [id number] [phone]            Discharge Medication List as of 7/11/2024  1:12 AM        CONTINUE these medications which have NOT CHANGED    Details   Alcohol Swabs 70 % PADS Check blood sugars three times daily, Normal      amLODIPine (NORVASC) 5 mg tablet Take 1 tablet (5 mg total) by mouth daily, Starting Tue 2/20/2024, Until Fri 2/14/2025, Normal      Blood Glucose Monitoring Suppl (ONE TOUCH ULTRA 2) w/Device KIT Check blood sugars three times daily. Please substitute with appropriate alternative as covered by patient's insurance. Dx: E11.65, Normal      dulaglutide (Trulicity) 1.5 MG/0.5ML injection Inject 0.5 mL (1.5 mg total) under the skin every 7 days, Starting Tue 2/20/2024, Normal      glucose blood (OneTouch Ultra) test strip Check blood sugars three times daily. Please substitute with appropriate alternative as covered by patient's insurance. Dx: E11.65, Normal      losartan (COZAAR) 25 mg tablet Take 1 tablet (25 mg total) by mouth daily, Starting Tue 2/20/2024, Until Fri 2/14/2025, Normal      metFORMIN (GLUCOPHAGE) 1000 MG tablet TAKE 1 TABLET BY MOUTH TWICE A DAY WITH MEALS, Starting Wed 3/13/2024, Normal      OneTouch Delica Lancets 33G MISC Check blood sugars three times daily. Please substitute with appropriate alternative as covered by patient's insurance. Dx: E11.65, Normal      sildenafil (VIAGRA) 25 MG  tablet TAKE 1 TABLET BY MOUTH DAILY AS NEEDED FOR ERECTILE DYSFUNCTION., Normal           No discharge procedures on file.    PDMP Review       None             ED Provider  Attending physically available and evaluated Rex Bradshaw. I managed the patient along with the ED Attending.    Electronically Signed by           Helena Cottrell DO  07/11/24 3633

## 2024-07-11 NOTE — ED ATTENDING ATTESTATION
7/10/2024  I, Valerio Resendez DO, saw and evaluated the patient. I have discussed the patient with the resident/non-physician practitioner and agree with the resident's/non-physician practitioner's findings, Plan of Care, and MDM as documented in the resident's/non-physician practitioner's note, except where noted. All available labs and Radiology studies were reviewed.  I was present for key portions of any procedure(s) performed by the resident/non-physician practitioner and I was immediately available to provide assistance.       At this point I agree with the current assessment done in the Emergency Department.  I have conducted an independent evaluation of this patient a history and physical is as follows:    39 yo w/hx DM c/o hyperglycemia, burping, sweating, vomiting. No abd pain, dyspnea, leg pain or swelling. No other c/o at this time.    Imp: multiple c/o plan: cardiac eval, reassess.      ED Course         Critical Care Time  Procedures

## 2024-07-11 NOTE — DISCHARGE INSTRUCTIONS
You were seen in the emergency department for concern for uncontrolled sugars.  Your workup shows your hemoglobin A1c is 11.7 which is elevated from a few months ago.  Please follow-up with your primary care doctor for management of your diabetic medications including now likely needing insulin.    Please return to ED if you have severe abdominal pain, nausea or vomiting, shortness of breath, lightheadedness or other concerning symptoms.

## 2024-07-15 ENCOUNTER — RA CDI HCC (OUTPATIENT)
Dept: OTHER | Facility: HOSPITAL | Age: 39
End: 2024-07-15

## 2024-07-26 ENCOUNTER — OFFICE VISIT (OUTPATIENT)
Dept: INTERNAL MEDICINE CLINIC | Facility: CLINIC | Age: 39
End: 2024-07-26

## 2024-07-26 VITALS
HEIGHT: 73 IN | BODY MASS INDEX: 39.36 KG/M2 | HEART RATE: 57 BPM | SYSTOLIC BLOOD PRESSURE: 143 MMHG | WEIGHT: 297 LBS | DIASTOLIC BLOOD PRESSURE: 84 MMHG | TEMPERATURE: 97.9 F

## 2024-07-26 DIAGNOSIS — E11.9 TYPE 2 DIABETES MELLITUS WITHOUT COMPLICATION, WITHOUT LONG-TERM CURRENT USE OF INSULIN (HCC): Primary | ICD-10-CM

## 2024-07-26 LAB
DME PARACHUTE DELIVERY DATE REQUESTED: NORMAL
DME PARACHUTE ITEM DESCRIPTION: NORMAL
DME PARACHUTE ITEM DESCRIPTION: NORMAL
DME PARACHUTE ORDER STATUS: NORMAL
DME PARACHUTE SUPPLIER NAME: NORMAL
DME PARACHUTE SUPPLIER PHONE: NORMAL
SL AMB POCT GLUCOSE BLD: 348

## 2024-07-26 RX ORDER — SILDENAFIL 50 MG/1
50 TABLET, FILM COATED ORAL DAILY PRN
Qty: 10 TABLET | Refills: 0 | Status: SHIPPED | OUTPATIENT
Start: 2024-07-26

## 2024-07-26 RX ORDER — INSULIN DEGLUDEC 100 U/ML
14 INJECTION, SOLUTION SUBCUTANEOUS
Qty: 12.6 ML | Refills: 0 | Status: SHIPPED | OUTPATIENT
Start: 2024-07-26 | End: 2024-10-24

## 2024-07-27 NOTE — PROGRESS NOTES
Ambulatory Visit  Name: Rex Bradshaw      : 1985      MRN: [id number]  Encounter Provider: Kade Lindquist DO  Encounter Date: 2024   Encounter department: [facility]    Assessment & Plan   1. Type 2 diabetes mellitus without complication, without long-term current use of insulin (HCC)  Patient has poorly controlled diabetes.    Patient was seen recently in the ED due to dizziness and found to have hyperglycemia in the 400s.    Patient current regimen is Trulicity 1.5 weekly and metformin 1000 twice daily.  Patient blood glucose level in the clinic was 348  Last  A1c in July 10 was 11.7  Unclear if the patient is type I or type II will test to make sure  Will start long-acting insulin 14 units daily  Will increase Trulicity to 3 mg subcutaneous weekly  Patient was provided with glucose log to bring it during 2 weeks follow-up visit  Consider adjusting insulin as needed  Patient was advised to check his blood glucose level daily  Patient was advised about the importance of weight loss and was provided by referral to weight management  Patient was advised about the complications of diabetes  -     Anti-islet cell antibody; Future  -     Glutamic acid decarboxylase; Future  -     Ambulatory Referral to Diabetic Education; Future  -     Ambulatory Referral to Weight Management; Future  -     Lipid panel; Future  -     sildenafil (VIAGRA) 50 MG tablet; Take 1 tablet (50 mg total) by mouth daily as needed for erectile dysfunction  -     insulin degludec (Tresiba FlexTouch) 100 units/mL injection pen; Inject 14 Units under the skin daily at bedtime  -     dulaglutide (Trulicity) 3 MG/0.5ML injection; Inject 0.5 mL (3 mg total) under the skin every 7 days  -     POCT blood glucose    2.  Hypertension  -Patient has history of hypertension  Patient is on losartan 25 and amlodipine 5 mg daily  Continue current regimen.       History of Present Illness     HPI  39-year-old male  patient with past medical history of hypertension, diabetes and obesity who presents to the clinic for follow-up of his diabetes.  Patient has poorly controlled diabetes.  Patient was seen recently in the ED due to dizziness and found to have hyperglycemia in the 400s.  Patient current regimen is Trulicity 1.5 weekly and metformin 1000 twice daily.  For hypertension patient is on losartan 25 mg daily and amlodipine 5 mg daily.  Patient denied any numbness or tingling or visual changes or chest pain.  Review of Systems   Constitutional:  Negative for activity change, appetite change, chills, fatigue and unexpected weight change.   HENT:  Negative for dental problem, ear pain, hearing loss, nosebleeds, rhinorrhea, sinus pain, sneezing, tinnitus and voice change.    Eyes:  Negative for pain, discharge, redness and visual disturbance.   Respiratory:  Negative for apnea, cough, chest tightness, shortness of breath, wheezing and stridor.    Cardiovascular:  Negative for chest pain, palpitations and leg swelling.   Gastrointestinal:  Negative for abdominal distention, anal bleeding, blood in stool, constipation and diarrhea.   Endocrine: Negative for cold intolerance, polydipsia, polyphagia and polyuria.   Genitourinary:  Negative for difficulty urinating, dysuria, flank pain, genital sores, penile pain, scrotal swelling and urgency.   Musculoskeletal:  Negative for arthralgias, back pain, joint swelling, myalgias and neck pain.   Skin:  Negative for pallor and wound.   Neurological:  Negative for dizziness, seizures, syncope, facial asymmetry, weakness, light-headedness and headaches.   Psychiatric/Behavioral:  Negative for agitation, confusion, decreased concentration and hallucinations. The patient is not nervous/anxious and is not hyperactive.      Past Medical History:   Diagnosis Date    Diabetes mellitus (HCC)     Foot injury     Fractures     Hypertension      Past Surgical History:   Procedure Laterality Date     "TONSILECTOMY AND ADNOIDECTOMY       Family History   Problem Relation Age of Onset    No Known Problems Mother     No Known Problems Father     No Known Problems Daughter     No Known Problems Daughter     No Known Problems Daughter      Social History     Tobacco Use    Smoking status: Never    Smokeless tobacco: Never   Vaping Use    Vaping status: Former   Substance and Sexual Activity    Alcohol use: No    Drug use: Yes     Types: Marijuana     Comment: percocet    Sexual activity: Not on file     Current Outpatient Medications on File Prior to Visit   Medication Sig    Alcohol Swabs 70 % PADS Check blood sugars three times daily    amLODIPine (NORVASC) 5 mg tablet Take 1 tablet (5 mg total) by mouth daily    Blood Glucose Monitoring Suppl (ONE TOUCH ULTRA 2) w/Device KIT Check blood sugars three times daily. Please substitute with appropriate alternative as covered by patient's insurance. Dx: E11.65    glucose blood (OneTouch Ultra) test strip Check blood sugars three times daily. Please substitute with appropriate alternative as covered by patient's insurance. Dx: E11.65    losartan (COZAAR) 25 mg tablet Take 1 tablet (25 mg total) by mouth daily    metFORMIN (GLUCOPHAGE) 1000 MG tablet TAKE 1 TABLET BY MOUTH TWICE A DAY WITH MEALS    OneTouch Delica Lancets 33G MISC Check blood sugars three times daily. Please substitute with appropriate alternative as covered by patient's insurance. Dx: E11.65     No Known Allergies  Immunization History   Administered Date(s) Administered    COVID-19 PFIZER VACCINE 0.3 ML IM 09/26/2021, 11/18/2021    Tdap 06/25/2009    Tuberculin Skin Test-PPD Intradermal 12/21/2012, 12/16/2014, 12/22/2016     Objective     /84 (BP Location: Left arm, Patient Position: Sitting, Cuff Size: Large)   Pulse 57   Temp 97.9 °F (36.6 °C) (Temporal)   Ht 6' 1\" (1.854 m)   Wt 135 kg (297 lb)   BMI 39.18 kg/m²     Physical Exam  Constitutional:       General: He is not in acute distress.   "   Appearance: He is obese. He is not ill-appearing, toxic-appearing or diaphoretic.   HENT:      Head: Normocephalic and atraumatic.      Nose: Nose normal. No congestion or rhinorrhea.   Eyes:      General: No scleral icterus.        Right eye: No discharge.         Left eye: No discharge.      Pupils: Pupils are equal, round, and reactive to light.   Neck:      Vascular: No carotid bruit.   Cardiovascular:      Rate and Rhythm: Normal rate and regular rhythm.      Pulses: Normal pulses.      Heart sounds: Normal heart sounds. No murmur heard.     No friction rub. No gallop.   Pulmonary:      Effort: Pulmonary effort is normal. No respiratory distress.      Breath sounds: No stridor. No wheezing or rhonchi.   Abdominal:      General: Abdomen is flat. Bowel sounds are normal. There is no distension.      Palpations: Abdomen is soft. There is no mass.      Tenderness: There is no abdominal tenderness. There is no guarding or rebound.      Hernia: No hernia is present.   Musculoskeletal:         General: No swelling, tenderness, deformity or signs of injury. Normal range of motion.      Cervical back: Normal range of motion. No rigidity or tenderness.   Lymphadenopathy:      Cervical: No cervical adenopathy.   Skin:     General: Skin is warm.      Coloration: Skin is not jaundiced or pale.      Findings: No bruising or erythema.   Neurological:      General: No focal deficit present.      Mental Status: He is alert.      Cranial Nerves: No cranial nerve deficit.      Sensory: No sensory deficit.      Motor: No weakness.      Coordination: Coordination normal.   Psychiatric:         Mood and Affect: Mood normal.         Behavior: Behavior normal.         Thought Content: Thought content normal.         Judgment: Judgment normal.       Kade Lindquist DO PGY3  Internal Medicine Resident   [facility]

## 2024-08-09 ENCOUNTER — TELEPHONE (OUTPATIENT)
Dept: INTERNAL MEDICINE CLINIC | Facility: CLINIC | Age: 39
End: 2024-08-09

## 2024-08-28 ENCOUNTER — TELEPHONE (OUTPATIENT)
Dept: DIABETES SERVICES | Facility: OTHER | Age: 39
End: 2024-08-28

## 2024-08-30 ENCOUNTER — TELEPHONE (OUTPATIENT)
Dept: INTERNAL MEDICINE CLINIC | Facility: CLINIC | Age: 39
End: 2024-08-30

## 2024-08-30 DIAGNOSIS — E11.9 TYPE 2 DIABETES MELLITUS WITHOUT COMPLICATION, WITHOUT LONG-TERM CURRENT USE OF INSULIN (HCC): Primary | ICD-10-CM

## 2024-08-30 NOTE — TELEPHONE ENCOUNTER
Patient called in because he said that he was prescribed insulin during his last visit but a prescription for the needles was not given. Patient said [facility] was gave him enough to use for 1 month. He used his last needle last night.     Patient is concerned because he said if script is not sent today, he knows he will have to wait until Monday. He does not want to miss any doses

## 2024-09-03 ENCOUNTER — TELEPHONE (OUTPATIENT)
Dept: INTERNAL MEDICINE CLINIC | Facility: CLINIC | Age: 39
End: 2024-09-03

## 2024-09-03 RX ORDER — PEN NEEDLE, DIABETIC 32GX 5/32"
NEEDLE, DISPOSABLE MISCELLANEOUS
Qty: 100 EACH | Refills: 3 | Status: SHIPPED | OUTPATIENT
Start: 2024-09-03

## 2024-09-03 NOTE — TELEPHONE ENCOUNTER
Patient received insulin and needs pen needle for insulin pen. Patient is completely out. Please review as doc of the day and send script for needles

## 2024-09-03 NOTE — TELEPHONE ENCOUNTER
Pt returned Albina's call, I informed him Albina would reach out at a later time as she was with a pt.

## 2024-10-16 ENCOUNTER — TELEPHONE (OUTPATIENT)
Dept: DIABETES SERVICES | Facility: OTHER | Age: 39
End: 2024-10-16

## 2024-10-17 DIAGNOSIS — E11.9 TYPE 2 DIABETES MELLITUS WITHOUT COMPLICATION, WITHOUT LONG-TERM CURRENT USE OF INSULIN (HCC): ICD-10-CM

## 2024-10-21 DIAGNOSIS — E11.9 TYPE 2 DIABETES MELLITUS WITHOUT COMPLICATION, WITHOUT LONG-TERM CURRENT USE OF INSULIN (HCC): ICD-10-CM

## 2024-10-21 RX ORDER — INSULIN DEGLUDEC 100 U/ML
14 INJECTION, SOLUTION SUBCUTANEOUS
Qty: 15 ML | Refills: 1 | Status: SHIPPED | OUTPATIENT
Start: 2024-10-21 | End: 2025-05-23

## 2024-10-23 ENCOUNTER — TELEPHONE (OUTPATIENT)
Dept: DIABETES SERVICES | Facility: OTHER | Age: 39
End: 2024-10-23

## 2024-10-24 RX ORDER — SILDENAFIL 50 MG/1
TABLET, FILM COATED ORAL
Qty: 10 TABLET | Refills: 0 | Status: SHIPPED | OUTPATIENT
Start: 2024-10-24

## 2024-12-04 DIAGNOSIS — E11.9 TYPE 2 DIABETES MELLITUS WITHOUT COMPLICATION, WITHOUT LONG-TERM CURRENT USE OF INSULIN (HCC): ICD-10-CM

## 2024-12-04 RX ORDER — INSULIN DEGLUDEC 100 U/ML
14 INJECTION, SOLUTION SUBCUTANEOUS
Qty: 15 ML | Refills: 1 | Status: CANCELLED | OUTPATIENT
Start: 2024-12-04 | End: 2025-07-06

## 2024-12-04 NOTE — TELEPHONE ENCOUNTER
Patient called to say that he is out of his insulin as of today.    He's been over dosing, and not only using it at bedtime like the directions state. He said in the AM when his sugar is high he would give himself insulin to help.    He said he works for [employer] and its busy season by the time he gets done with work we are closed to make an appt. He asked if a VV would be okay for him to do if needed.

## 2024-12-05 NOTE — TELEPHONE ENCOUNTER
Patient needs to be seen in office.  His last HbA1c was elevated and BS elevated. He was non compliant with diabetes education and referral closed. Please reach out to set up an in office follow up. It would be in his best interest.

## 2024-12-05 NOTE — TELEPHONE ENCOUNTER
Contacted patient's [facility] pharmacy at . Spoke with pharmacist. Patient's recently picked up his Tresiba on 10/21/24 for a 90 day supply. Patient's medication is not due for a refill until January.     Discussed with physician patient's plan of care. Patient last seen in office 7/26/24. HBA1C 11.7 on 7/10/24. POCT blood glucose on 7/26/24 at 348. Per patient and spouse, he works for [employer] and this is the busy time of the year. Patient may not be able to physically come in for an appointment. Per physician, patient may have a virtual appointment for diabetic follow-up.     Clerical updated. Patient scheduled for Virtual appoinment on 12/6/24.

## 2024-12-06 ENCOUNTER — TELEMEDICINE (OUTPATIENT)
Dept: INTERNAL MEDICINE CLINIC | Facility: CLINIC | Age: 39
End: 2024-12-06

## 2024-12-06 DIAGNOSIS — E11.9 TYPE 2 DIABETES MELLITUS WITHOUT COMPLICATION, WITHOUT LONG-TERM CURRENT USE OF INSULIN (HCC): ICD-10-CM

## 2024-12-06 RX ORDER — INSULIN DEGLUDEC 200 U/ML
50 INJECTION, SOLUTION SUBCUTANEOUS
Qty: 7.5 ML | Refills: 0 | Status: SHIPPED | OUTPATIENT
Start: 2024-12-06 | End: 2024-12-06

## 2024-12-06 RX ORDER — INSULIN DEGLUDEC 200 U/ML
50 INJECTION, SOLUTION SUBCUTANEOUS
Qty: 22.5 ML | Refills: 3 | Status: SHIPPED | OUTPATIENT
Start: 2024-12-06

## 2024-12-06 RX ORDER — LOSARTAN POTASSIUM 25 MG/1
25 TABLET ORAL DAILY
Qty: 90 TABLET | Refills: 3 | Status: SHIPPED | OUTPATIENT
Start: 2024-12-06 | End: 2025-12-01

## 2024-12-06 NOTE — PROGRESS NOTES
Name: Rex Bradshaw      : 1985      MRN: [id number]  Encounter Provider: Vinh Vega DO  Encounter Date: 2024   Encounter department: [facility] [place]  :  Assessment & Plan  Type 2 diabetes mellitus without complication, without long-term current use of insulin (Formerly [facility])    Lab Results   Component Value Date    HGBA1C 11.7 (H) 07/10/2024     - A1c 11.7 on 7/10/24  - Anti-islet cell antibody and GAD65 ordered in 2024, lipid panel and UACR ordered since 2024, remind patient to get them completed  - family history significant for prediabetes in father  - Will order A1c and have patient to complete all the blood work before coming back in a month for IN-PERSON VISIT  - Current regimen includes Tresiba 14U at bedtime, metformin 1000 mg twice daily, and Trulicity 3 mg weekly . But Trulicity was not covered by insurance, patient has not been taking it. Patient has been doing Tresiba 15U TID and metformin 1000 mg daily  - Denies visual changes, tingling/numbness/burning sensation or decreased sensation in his feet/hands  - Endorses polyuria almost every hour, polydipsia, but no polyphasia  - Diabetic foot exam 24 revealed intact monofilament testting b/l, intact propioception b/l, and calluses with macerations noted b/l. But no pre-ulcer or ulcer  - IRIS ordered but has not completed since Feb  - Encourage patient to limit the carb intake and increase fibers and proteins intake  - Encourage patient to do 150 mins of physical activity/week outside of work, patient states he can do 30 mins of daily walk   - Remind patient to wear protective footwear when he goes out and checks for foot ulcers regularly   - Will prescribe Tresiba 50U at bedtime and encourage patient to take metformin 1000 mg daily  - Remind patient to check his BG and keep a log and bring it to the next appointment  - Will have patient to come back in a month for BG check   - Will do IRIS and diabetic foot exam  in next appointment    Orders:    insulin degludec (Tresiba FlexTouch) 200 units/mL CONCENTRATED U-200 injection pen; Inject 50 Units under the skin daily at bedtime    Hemoglobin A1C; Future    losartan (COZAAR) 25 mg tablet; Take 1 tablet (25 mg total) by mouth daily        BMI Counseling: There is no height or weight on file to calculate BMI. The BMI is above normal. Nutrition recommendations include decreasing fast food intake, consuming healthier snacks, limiting drinks that contain sugar, moderation in carbohydrate intake and reducing intake of cholesterol. Exercise recommendations include exercising 3-5 times per week. No pharmacotherapy was ordered. Rationale for BMI follow-up plan is due to patient being overweight or obese.       History of Present Illness     Rex Bradshaw is a 39-year-old male with a past medical history of HTN, DMT2, erectile dysfunction, and obesity presents to the virtual visit for DM f/u. Patient was last seen in the clinic on 7/26/24 for DM and HTN f/u. Patient has no acute complaints other than running out of his Tresiba as he has been taking it three times daily instead of once at bedtime. Denies fever, headache, vison changes, SOB, chest pain/discomfort, heart palpitation, abdominal pain, nausea, vomiting, diarrhea, urinary symptoms, and leg swelling.     T2DM  - Family history significant for prediabetes in father  - Patient states his morning BG has been running in the 300s and 300s after meals and before bedtime  - Current regimen includes Tresiba 14U at bedtime, metformin 1000 mg twice daily, and Trulicity 3 mg weekly; however insurance did not cover his Trulicity  - He has been taking Tresiba 15U three times day and metformin 1000 mg daily  - Denies visual changes, tingling/numbness/burning sensation or decreased sensation in his feet/hands  - Endorses increased urinary frequency every hour, increased water intake, but no increase in food intake/hunger  - Has never seen a  podiatrist nor ophthalmologist.  But states he has no pre-ulcer or ulcer in his feet and no vision changes  - Patient does not drink sodas/juices; he eats a lot of rice and proteins  - Patient works as a [employer]  and his job is laborious,no time for physical activities after work        Review of Systems   Constitutional:  Negative for chills and fever.   HENT:  Negative for ear pain and sore throat.    Eyes:  Negative for pain and visual disturbance.   Respiratory:  Negative for cough, shortness of breath, wheezing and stridor.    Cardiovascular:  Negative for chest pain, palpitations and leg swelling.   Gastrointestinal:  Negative for abdominal pain, blood in stool, constipation, diarrhea, nausea and vomiting.   Endocrine: Positive for polydipsia and polyuria. Negative for polyphagia.   Genitourinary:  Negative for decreased urine volume, difficulty urinating, dysuria and hematuria.   Musculoskeletal:  Negative for arthralgias and back pain.   Skin:  Negative for color change and rash.   Neurological:  Negative for seizures, syncope, weakness, light-headedness and headaches.   All other systems reviewed and are negative.    Pertinent Medical History          Objective   There were no vitals taken for this visit.     Physical Exam  Administrative Statements     Unable to perform physical exam due to the nature of virtual visit    I have spent a total time of 30 minutes in caring for this patient on the day of the visit/encounter including Diagnostic results, Risks and benefits of tx options, Instructions for management, Patient and family education, Importance of tx compliance, Risk factor reductions, Impressions, Counseling / Coordination of care, Documenting in the medical record, Reviewing / ordering tests, medicine, procedures  , and Obtaining or reviewing history  . Topics discussed with the patient / family include symptom assessment and management, medication review, and medication adjustment.

## 2024-12-06 NOTE — ASSESSMENT & PLAN NOTE
Lab Results   Component Value Date    HGBA1C 11.7 (H) 07/10/2024     - A1c 11.7 on 7/10/24  - Anti-islet cell antibody and GAD65 ordered in 7/2024, lipid panel and UACR ordered since 2/2024, remind patient to get them completed  - family history significant for prediabetes in father  - Will order A1c and have patient to complete all the blood work before coming back in a month for IN-PERSON VISIT  - Current regimen includes Tresiba 14U at bedtime, metformin 1000 mg twice daily, and Trulicity 3 mg weekly . But Trulicity was not covered by insurance, patient has not been taking it. Patient has been doing Tresiba 15U TID and metformin 1000 mg daily  - Denies visual changes, tingling/numbness/burning sensation or decreased sensation in his feet/hands  - Endorses polyuria almost every hour, polydipsia, but no polyphasia  - Diabetic foot exam 2/20/24 revealed intact monofilament testting b/l, intact propioception b/l, and calluses with macerations noted b/l. But no pre-ulcer or ulcer  - IRIS ordered but has not completed since Feb  - Encourage patient to limit the carb intake and increase fibers and proteins intake  - Encourage patient to do 150 mins of physical activity/week outside of work, patient states he can do 30 mins of daily walk   - Remind patient to wear protective footwear when he goes out and checks for foot ulcers regularly   - Will prescribe Tresiba 50U at bedtime and encourage patient to take metformin 1000 mg daily  - Remind patient to check his BG and keep a log and bring it to the next appointment  - Will have patient to come back in a month for BG check   - Will do IRIS and diabetic foot exam in next appointment    Orders:    insulin degludec (Tresiba FlexTouch) 200 units/mL CONCENTRATED U-200 injection pen; Inject 50 Units under the skin daily at bedtime    Hemoglobin A1C; Future    losartan (COZAAR) 25 mg tablet; Take 1 tablet (25 mg total) by mouth daily

## 2024-12-06 NOTE — PATIENT INSTRUCTIONS
- Please take Tresiba 50 units at BEDTIME  - Alternate the locations for insulin injections  - Please continue to take metformin 1000 mg TWICE DAILY  - Please get the blood work done as soon as you can  - Please keep a log of blood glucose for us and bring it to the next appointment  - Please tale juices if your blood glucose is less than 80

## 2024-12-21 DIAGNOSIS — E11.9 TYPE 2 DIABETES MELLITUS WITHOUT COMPLICATION, WITHOUT LONG-TERM CURRENT USE OF INSULIN (HCC): ICD-10-CM

## 2024-12-24 RX ORDER — SILDENAFIL 50 MG/1
TABLET, FILM COATED ORAL
Qty: 10 TABLET | Refills: 0 | Status: SHIPPED | OUTPATIENT
Start: 2024-12-24

## 2024-12-24 NOTE — TELEPHONE ENCOUNTER
Patient is requesting status of refill. He wants to see if possible, can refill be sent today since office is closed tomorrow.

## 2025-01-01 DIAGNOSIS — E11.9 TYPE 2 DIABETES MELLITUS WITHOUT COMPLICATION, WITHOUT LONG-TERM CURRENT USE OF INSULIN (HCC): ICD-10-CM

## 2025-01-02 ENCOUNTER — TELEPHONE (OUTPATIENT)
Dept: INTERNAL MEDICINE CLINIC | Facility: CLINIC | Age: 40
End: 2025-01-02

## 2025-01-02 RX ORDER — SILDENAFIL 50 MG/1
TABLET, FILM COATED ORAL
Qty: 10 TABLET | Refills: 3 | Status: SHIPPED | OUTPATIENT
Start: 2025-01-02

## 2025-01-02 NOTE — TELEPHONE ENCOUNTER
Prior authorization completed on EPIC for patient's Sildenafil (Viagra). Medication denied.     Contacted patient's [facility] pharmacy. Spoke with Eduar. Patient has been paying for medication oop with a discount card. Medication not covered under patient's [employer] plan.     No other needs noted at this time.

## 2025-01-21 ENCOUNTER — TELEPHONE (OUTPATIENT)
Dept: INTERNAL MEDICINE CLINIC | Facility: CLINIC | Age: 40
End: 2025-01-21

## 2025-02-05 ENCOUNTER — TELEPHONE (OUTPATIENT)
Dept: INTERNAL MEDICINE CLINIC | Facility: CLINIC | Age: 40
End: 2025-02-05

## 2025-03-05 DIAGNOSIS — E11.9 TYPE 2 DIABETES MELLITUS WITHOUT COMPLICATION, WITHOUT LONG-TERM CURRENT USE OF INSULIN (HCC): ICD-10-CM

## 2025-03-10 DIAGNOSIS — E11.9 TYPE 2 DIABETES MELLITUS WITHOUT COMPLICATION, WITHOUT LONG-TERM CURRENT USE OF INSULIN (HCC): ICD-10-CM

## 2025-03-10 RX ORDER — SILDENAFIL 50 MG/1
TABLET, FILM COATED ORAL
Qty: 10 TABLET | Refills: 2 | Status: SHIPPED | OUTPATIENT
Start: 2025-03-10

## 2025-03-12 ENCOUNTER — TELEPHONE (OUTPATIENT)
Dept: INTERNAL MEDICINE CLINIC | Facility: CLINIC | Age: 40
End: 2025-03-12

## 2025-03-12 NOTE — TELEPHONE ENCOUNTER
Prior authorization denied for patient's Sildenafil (Viagra). Patient has been purchasing medication with a discount card.

## 2025-03-14 ENCOUNTER — TELEPHONE (OUTPATIENT)
Dept: INTERNAL MEDICINE CLINIC | Facility: CLINIC | Age: 40
End: 2025-03-14

## 2025-03-14 NOTE — LETTER
[facility]  [address]  [address]  [place] [address]  Phone#  [phone]  Fax#  [phone]    Rex Bradshaw  [address]  [facility] [id number] March 14, 2025      Dear:   Rex Bradshaw         Our office has attempted to contact you several times regarding your Appointment.  Could you please contact our office at [phone].    Thank you.     Sincerely,    [facility]

## 2025-04-22 ENCOUNTER — TELEPHONE (OUTPATIENT)
Dept: INTERNAL MEDICINE CLINIC | Facility: CLINIC | Age: 40
End: 2025-04-22

## 2025-04-22 NOTE — TELEPHONE ENCOUNTER
Try calling to schedule physical, BP & depression screening- call can't be complete....letter sent

## 2025-05-07 ENCOUNTER — OFFICE VISIT (OUTPATIENT)
Dept: INTERNAL MEDICINE CLINIC | Facility: CLINIC | Age: 40
End: 2025-05-07

## 2025-05-07 VITALS
OXYGEN SATURATION: 98 % | DIASTOLIC BLOOD PRESSURE: 89 MMHG | WEIGHT: 314 LBS | TEMPERATURE: 98.6 F | HEART RATE: 65 BPM | SYSTOLIC BLOOD PRESSURE: 139 MMHG | BODY MASS INDEX: 41.43 KG/M2

## 2025-05-07 DIAGNOSIS — E11.00 TYPE 2 DIABETES MELLITUS WITH HYPEROSMOLARITY WITHOUT COMA, WITHOUT LONG-TERM CURRENT USE OF INSULIN (HCC): Primary | ICD-10-CM

## 2025-05-07 DIAGNOSIS — R06.83 SNORING: ICD-10-CM

## 2025-05-07 DIAGNOSIS — Z11.59 NEED FOR HEPATITIS C SCREENING TEST: ICD-10-CM

## 2025-05-07 DIAGNOSIS — M25.532 LEFT WRIST PAIN: ICD-10-CM

## 2025-05-07 DIAGNOSIS — Z11.4 SCREENING FOR HIV (HUMAN IMMUNODEFICIENCY VIRUS): ICD-10-CM

## 2025-05-07 DIAGNOSIS — I10 PRIMARY HYPERTENSION: ICD-10-CM

## 2025-05-07 DIAGNOSIS — E11.00 TYPE 2 DIABETES MELLITUS WITH HYPEROSMOLARITY WITHOUT COMA, WITHOUT LONG-TERM CURRENT USE OF INSULIN (HCC): ICD-10-CM

## 2025-05-07 DIAGNOSIS — R74.8 ELEVATED LIVER ENZYMES: ICD-10-CM

## 2025-05-07 DIAGNOSIS — E11.9 TYPE 2 DIABETES MELLITUS WITHOUT COMPLICATION, WITHOUT LONG-TERM CURRENT USE OF INSULIN (HCC): ICD-10-CM

## 2025-05-07 DIAGNOSIS — E66.09 OBESITY DUE TO EXCESS CALORIES WITHOUT SERIOUS COMORBIDITY, UNSPECIFIED CLASS: ICD-10-CM

## 2025-05-07 PROBLEM — N52.9 ERECTILE DYSFUNCTION: Status: ACTIVE | Noted: 2025-05-07

## 2025-05-07 LAB — SL AMB POCT HEMOGLOBIN AIC: 11.1 (ref ?–6.5)

## 2025-05-07 RX ORDER — ACYCLOVIR 400 MG/1
1 TABLET ORAL
Qty: 9 EACH | Refills: 3 | Status: SHIPPED | OUTPATIENT
Start: 2025-05-07

## 2025-05-07 RX ORDER — SEMAGLUTIDE 0.25 MG/.5ML
INJECTION, SOLUTION SUBCUTANEOUS
Qty: 2 ML | Refills: 0 | Status: SHIPPED | OUTPATIENT
Start: 2025-05-07 | End: 2025-05-07

## 2025-05-07 RX ORDER — LISINOPRIL 20 MG/1
20 TABLET ORAL DAILY
Qty: 100 TABLET | Refills: 3 | Status: SHIPPED | OUTPATIENT
Start: 2025-05-07

## 2025-05-07 RX ORDER — ACYCLOVIR 400 MG/1
1 TABLET ORAL ONCE
Qty: 1 EACH | Refills: 0 | Status: SHIPPED | OUTPATIENT
Start: 2025-05-07 | End: 2025-05-07

## 2025-05-07 RX ORDER — TIRZEPATIDE 2.5 MG/.5ML
2.5 INJECTION, SOLUTION SUBCUTANEOUS WEEKLY
Qty: 2 ML | Refills: 0 | Status: SHIPPED | OUTPATIENT
Start: 2025-05-07 | End: 2025-06-04

## 2025-05-07 RX ORDER — TIRZEPATIDE 2.5 MG/.5ML
2.5 INJECTION, SOLUTION SUBCUTANEOUS WEEKLY
Refills: 0 | OUTPATIENT
Start: 2025-05-07 | End: 2025-06-04

## 2025-05-08 NOTE — PROGRESS NOTES
INTERNAL MEDICINE FOLLOW-UP OFFICE VISIT  [facility]  [address]  [place] [address]    NAME: Rex Bradshaw  AGE: 39 y.o. SEX: male    DATE OF ENCOUNTER: 5/8/2025    Assessment and Plan     1. Type 2 diabetes mellitus with hyperosmolarity without coma, without long-term current use of insulin (HCC) (Primary)  History of uncontrolled insulin-dependent DM since 2021, HTN, obesity who presents for follow up.   Last seen in office in 2024 for DM and since then was lost to follow up.   Compliant with insulin Tresiba 50U qHS, metformin 1000mg bid. Previously prescribed Trulicity but has been unable to afford out-of-pocket cost hence has not been taking it. I  Intermittently takes additional Tresiba 40U qAM if he subjectively feels like his BS is high. Does not formally check BS at home.   Some mild LE numbness endorsed, no vision change. Has hx of ED which has been worsening, for which he has been increasing his Viagra dose  Eats two meals a day, breakfast and dinner, with dinner being the largest meal of the day.      POCT HgbA1c 11.1 today, similar to prior  DM foot exam with diminished sensation, see below.   DM eye exam not UTD    Plan:   Will provide Rx for CGM monitoring. Instructed to make nursing visit for education  For now, instructed to continue Tresiba 50U qHS, metformin 1000mg bid, and start Wegovy therapy in place of Trulicity.  Further changes in basal/bolus insulin likey needed however CGM data to be reviewed at close follow up visit to guide further management.   Continue statin. Continue Lisinopril 20mg for HTN/renal protection.   Check serologies to rule out RJ, routine labs.   Referral for DM education provided.    Advised to not skip meals, avoid foods high in carbs. Recommended to reduce or limit recreational marijuana use, additional BMI counseling provided as below  Follow up in 4 weeks for re-eval and assess compliance  Offer IRIS at  next visit (does not have time today)    - Continuous Glucose  (Dexcom G7 ) FINA; Use 1 each once for 1 dose  Dispense: 1 each; Refill: 0  - Continuous Glucose Sensor (Dexcom G7 Sensor); Use 1 Device every 10 days  Dispense: 9 each; Refill: 3  - tirzepatide (Zepbound) 2.5 mg/0.5 mL auto-injector; Inject 0.5 mL (2.5 mg total) under the skin once a week for 28 days  Dispense: 2 mL; Refill: 0  - Albumin / creatinine urine ratio; Future  - Lipid panel; Future  - Glutamic acid decarboxylase; Future  - Anti-islet cell antibody; Future  - Comprehensive metabolic panel; Future  - Ambulatory Referral to Diabetic Education; Future    2. Primary hypertension  Continue Lisnopril  - lisinopril (ZESTRIL) 20 mg tablet; Take 1 tablet (20 mg total) by mouth daily  Dispense: 100 tablet; Refill:     3. 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 Obesity due to excess calories without serious comorbidity, unspecified class  BMI Counseling: Body mass index is 41.43 kg/m². The BMI is above normal. Nutrition recommendations include reducing portion sizes, decreasing overall calorie intake, 3-5 servings of fruits/vegetables daily, reducing fast food intake, consuming healthier snacks, decreasing soda and/or juice intake, moderation in carbohydrate intake, increasing intake of lean protein, reducing intake of saturated fat and trans fat, and reducing intake of cholesterol. Exercise recommendations include moderate aerobic physical activity for 150 minutes/week.     5. Snoring  Loud and habitual with apneas/gasping/choking during sleep, daytime sleepiness.   Elevated STOP BANG, has risk factors including obesity/large neck circumference, HTN, male gender  Plan:   - CBC and differential; Future  - Ambulatory Referral to Sleep Medicine for further  testing     6. Screening for HIV (human immunodeficiency virus)  - HIV 1/2 AG/AB w Reflex [facility] for 2 yr old and above; Future    7. Left wrist pain  Acute on chronic, worsened by heavy lifting of a box at work.   Has been wearing a OTC wrist brace, and taking OTC meds for pain relief  Neurovascular intact, TTP at scaphoid region  Plan:  XR wrist 2 vw left; Future    8. Need for hepatitis C screening test  - Hepatitis C Antibody; Future    Orders Placed This Encounter   Procedures    XR wrist 2 vw left    Lipid panel    Glutamic acid decarboxylase    Anti-islet cell antibody    Albumin / creatinine urine ratio    HIV 1/2 AG/AB w Reflex [facility] for 2 yr old and above    Comprehensive metabolic panel    CBC and differential    Hepatitis C Antibody    Ambulatory Referral to Sleep Medicine    Ambulatory Referral to Diabetic Education    POCT hemoglobin A1c       - Counseling Documentation: patient was counseled regarding: diagnostic results, instructions for management, risk factor reductions, prognosis, patient and family education, impressions, risks and benefits of treatment options, and importance of compliance with treatment  - Counseling Time: not applicable    BMI Counseling: Body mass index is 41.43 kg/m². The BMI is above normal. Nutrition recommendations include reducing portion sizes, decreasing overall calorie intake, 3-5 servings of fruits/vegetables daily, reducing fast food intake, consuming healthier snacks, decreasing soda and/or juice intake, moderation in carbohydrate intake, increasing intake of lean protein, reducing intake of saturated fat and trans fat, and reducing intake of cholesterol. Exercise recommendations include moderate aerobic physical activity for 150 minutes/week. Pharmacotherapy was ordered for patient to aid in weight loss.     Chief Complaint     Chief Complaint   Patient presents with    Follow-up    Wrist Pain     Left wrist pain, 2 months and keeps worsening     Foreign Body in  Skin     Right middle finger has a splinter for  2 months        History of Present Illness     Patient is a 40yo M with hx of uncontrolled insulin-dependent DM, HTN, obesity who presents for follow up. Last seen in office in 2024 for DM and since then was lost to follow up. Has been compliant with insulin Tresiba 50U qHS, metformin 1000mg bid. Previously prescribed Trulicity but has been unable to afford out-of-pocket cost hence has not been taking it. Intermittently takes additional Tresiba 40U qAM if he feels like his BS is high. Does not formally check BS at home. Denies dizziness, lightheadedness, diaphoresis. No new or worsening vision changes. Some mild LE numbness endorsed. Has hx of ED which has been worsening, for which he has been increasing his Viagra dose. Does not partake in any formal exercise due to time constraints. 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 Eats two meals a day, breakfast and dinner, with dinner being the largest meal of the day. No major dietary indescritions reported. Has not participated in DM education classes.  No cigarette smoking reported but smokes Marijuana regularly. No ETOH use.     Additionally, he endorses acute on chronic L wrist pain that began after heavy lifting of a box while he was at work. Pain is persistent and worse with movement. Denies numbness and tingling. No new trauma.      The following portions of the patient's history were reviewed and updated as appropriate: allergies, current medications, past family history, past medical history, past social history, past surgical history and problem list.    Review of Systems     Review of Systems  All ROS negative unless otherwise stated in the HPI    Active Problem List     Patient Active Problem List   Diagnosis    Obesity    Obstructive sleep apnea    Type 2 diabetes mellitus (HCC)    Allergic rhinitis    Primary hypertension    Erectile dysfunction       Objective     /89 (BP Location: Right  arm)   Pulse 65   Temp 98.6 °F (37 °C) (Temporal)   Wt (!) 142 kg (314 lb)   SpO2 98%   BMI 41.43 kg/m²     Physical Exam  Constitutional:       General: He is not in acute distress.     Appearance: Normal appearance. He is obese. He is not ill-appearing or toxic-appearing.   HENT:      Nose: Nose normal.      Mouth/Throat:      Mouth: Mucous membranes are moist.   Eyes:      Conjunctiva/sclera: Conjunctivae normal.   Cardiovascular:      Rate and Rhythm: Normal rate and regular rhythm.      Pulses: Normal pulses. no weak pulses.           Dorsalis pedis pulses are 2+ on the right side and 2+ on the left side.        Posterior tibial pulses are 2+ on the right side and 2+ on the left side.      Heart sounds: Normal heart sounds. No murmur heard.     No friction rub. No gallop.   Pulmonary:      Effort: Pulmonary effort is normal.      Breath sounds: Normal breath sounds. No wheezing or rhonchi.   Abdominal:      General: There is no distension.      Palpations: Abdomen is soft.      Tenderness: There is no abdominal tenderness.   Musculoskeletal:      Right lower leg: No edema.      Left lower leg: No edema.        Feet:    Feet:      Right foot:      Skin integrity: No ulcer, skin breakdown, erythema, warmth, callus or dry skin.      Left foot:      Skin integrity: No ulcer, skin breakdown, erythema, warmth, callus or dry skin.   Skin:     Capillary Refill: Capillary refill takes less than 2 seconds.      Findings: No rash.      Comments: Acanthosis nigracans posterior cervical aspect   Neurological:      Mental Status: He is alert and oriented to person, place, and time. Mental status is at baseline.     Diabetic Foot Exam    Patient's shoes and socks removed.    Right Foot/Ankle   Right Foot Inspection  Skin Exam: skin normal and skin intact. No dry skin, no warmth, no callus, no erythema, no maceration, no abnormal color, no pre-ulcer, no ulcer and no callus.     Toe Exam: No swelling    Sensory    Vibration: intact  Proprioception: intact  Monofilament testing: diminished    Vascular  Capillary refills: < 3 seconds  The right DP pulse is 2+. The right PT pulse is 2+.     Left Foot/Ankle  Left Foot Inspection  Skin Exam: skin normal and skin intact. No dry skin, no warmth, no erythema, no maceration, normal color, no pre-ulcer, no ulcer and no callus.     Toe Exam: No swelling.     Sensory   Vibration: intact  Monofilament testing: diminished    Vascular  Capillary refills: < 3 seconds  The left DP pulse is 2+. The left PT pulse is 2+.     Assign Risk Category  No deformity present  No loss of protective sensation  No weak pulses  Risk: 1            Pertinent Laboratory/Diagnostic Studies:  CBC:   Lab Results   Component Value Date/Time    WBC 10.59 (H) 07/10/2024 11:05 PM    RBC 5.14 07/10/2024 11:05 PM    HGB 15.0 07/10/2024 11:05 PM    HCT 44.7 07/10/2024 11:05 PM    MCV 87 07/10/2024 11:05 PM    MCH 29.2 07/10/2024 11:05 PM    MCHC 33.6 07/10/2024 11:05 PM    RDW 11.2 (L) 07/10/2024 11:05 PM    MPV 10.6 07/10/2024 11:05 PM     07/10/2024 11:05 PM    NRBC 0 04/12/2020 05:25 AM    NEUTOPHILPCT 81 (H) 04/12/2020 05:25 AM    LYMPHOPCT 12 (L) 04/12/2020 05:25 AM    MONOPCT 6 04/12/2020 05:25 AM    EOSPCT 0 04/12/2020 05:25 AM    BASOPCT 0 04/12/2020 05:25 AM    NEUTROABS 13.72 (H) 04/12/2020 05:25 AM    LYMPHSABS 1.97 04/12/2020 05:25 AM    MONOSABS 0.95 04/12/2020 05:25 AM    EOSABS 0.01 04/12/2020 05:25 AM     Chemistry Profile:   Lab Results   Component Value Date/Time    K 4.1 07/10/2024 11:05 PM    CL 99 07/10/2024 11:05 PM    CO2 30 07/10/2024 11:05 PM    BUN 16 07/10/2024 11:05 PM    CREATININE 0.78 07/10/2024 11:05 PM    GLUC 348 07/26/2024 12:12 PM    GLUC 280 (H) 07/10/2024 11:05 PM    CALCIUM 8.4 07/10/2024 11:05 PM    AST 31 07/10/2024 11:05 PM    ALT 60 (H) 07/10/2024 11:05 PM    ALKPHOS 72 07/10/2024 11:05 PM    EGFR 114 07/10/2024 11:05 PM     Health Maintenance: No results for input(s):  "\"PSA\", \"HEPCAB\" in the last 8784 hours.    Current Medications     Current Outpatient Medications:     Continuous Glucose Sensor (Dexcom G7 Sensor), Use 1 Device every 10 days, Disp: 9 each, Rfl: 3    lisinopril (ZESTRIL) 20 mg tablet, Take 1 tablet (20 mg total) by mouth daily, Disp: 100 tablet, Rfl: 3    tirzepatide (Zepbound) 2.5 mg/0.5 mL auto-injector, Inject 0.5 mL (2.5 mg total) under the skin once a week for 28 days, Disp: 2 mL, Rfl: 0    Alcohol Swabs 70 % PADS, Check blood sugars three times daily, Disp: 300 each, Rfl: 3    Blood Glucose Monitoring Suppl (ONE TOUCH ULTRA 2) w/Device KIT, Check blood sugars three times daily. Please substitute with appropriate alternative as covered by patient's insurance. Dx: E11.65, Disp: 1 kit, Rfl: 0    glucose blood (OneTouch Ultra) test strip, Check blood sugars three times daily. Please substitute with appropriate alternative as covered by patient's insurance. Dx: E11.65, Disp: 300 each, Rfl: 3    insulin degludec (Tresiba FlexTouch) 200 units/mL CONCENTRATED U-200 injection pen, Inject 50 Units under the skin daily at bedtime, Disp: 22.5 mL, Rfl: 3    Insulin Pen Needle (BD Pen Needle Maritza U/F) 32G X 4 MM MISC, Use daily as directed with insulin pen, Disp: 100 each, Rfl: 3    metFORMIN (GLUCOPHAGE) 1000 MG tablet, TAKE 1 TABLET BY MOUTH TWICE A DAY WITH FOOD, Disp: 180 tablet, Rfl: 1    OneTouch Delica Lancets 33G MISC, Check blood sugars three times daily. Please substitute with appropriate alternative as covered by patient's insurance. Dx: E11.65, Disp: 300 each, Rfl: 3    sildenafil (VIAGRA) 50 MG tablet, TAKE 1 TABLET BY MOUTH DAILY AS NEEDED FOR ERECTILE DYSFUNCTION, Disp: 10 tablet, Rfl: 2    Health Maintenance     Health Maintenance   Topic Date Due    Hepatitis C Screening  Never done    Kidney Health Evaluation: Albumin/Creatinine Ratio  Never done    Diabetic Eye Exam  Never done    HIV Screening  Never done    Annual Physical  02/20/2025    Diabetic Foot " Exam  02/20/2025    Kidney Health Evaluation: GFR  07/10/2025    COVID-19 Vaccine (3 - 2024-25 season) 08/07/2025 (Originally 9/1/2024)    Pneumococcal Vaccine: Pediatrics (0 to 5 Years) and At-Risk Patients (6 to 64 Years) (1 of 2 - PCV) 05/07/2026 (Originally 7/17/2004)    DTaP,Tdap,and Td Vaccines (2 - Td or Tdap) 05/07/2026 (Originally 6/25/2019)    Influenza Vaccine (Season Ended) 09/01/2025    HEMOGLOBIN A1C  11/07/2025    BMI: Followup Plan  12/06/2025    Depression Screening  05/07/2026    BMI: Adult  05/07/2026    Zoster Vaccine (1 of 2) 07/17/2035    Meningococcal B Vaccine  Aged Out    RSV Vaccine age 0-20 Months  Aged Out    HIB Vaccine  Aged Out    IPV Vaccine  Aged Out    Hepatitis A Vaccine  Aged Out    Meningococcal ACWY Vaccine  Aged Out    HPV Vaccine  Aged Out     Immunization History   Administered Date(s) Administered    COVID-19 PFIZER VACCINE 0.3 ML IM 09/26/2021, 11/18/2021    Tdap 06/25/2009    Tuberculin Skin Test-PPD Intradermal 12/21/2012, 12/16/2014, 12/22/2016         ----------------------------------------------------  Ethan Chang DO, PGY-3  Internal Medicine Residency   [facility] [place], [place]

## 2025-05-09 ENCOUNTER — TELEPHONE (OUTPATIENT)
Dept: INTERNAL MEDICINE CLINIC | Facility: CLINIC | Age: 40
End: 2025-05-09

## 2025-05-09 NOTE — TELEPHONE ENCOUNTER
Patient called to clarify his insulin dosing instructions.     He explained he has been taking 50 units of insulin NATHANIEL in the PM, but he was told to increased to BID due to recent labs at appointment on 5/7.    Patient explained his summary didn't include new instructions and he wants to make sure this is correct.     He also explained he will run out of his supply faster and wants to make sure he will be able to get a refill sooner.

## 2025-05-09 NOTE — TELEPHONE ENCOUNTER
Prior authorization initiated on [facility] for patient's Zepbound.     Pending review.

## 2025-05-14 NOTE — TELEPHONE ENCOUNTER
Did they check coverage for other GLP-1 agonists? He is an uncontrolled diabetic and has a BMI >30, so one of them should hopefully be covered

## 2025-05-14 NOTE — TELEPHONE ENCOUNTER
Reviewed chart notes and verbalized with Dr. Holcomb as he was attending at patient's most recent appt and today's attending. Per Dr. Chang's most recent office note patient is to continue using Tresiba 50 UNITS qHS, Metformin  1000mg BID and to start Zepbound.Called and spoke to patient. Per patient he was under the impression he was going to increase his dose for Tresiba due to his A1C being 11.1%. Patient stated he continued his current regimen as he was unsure if the change of dose was okay to start as the AVS did not state change.I advised patient as per note patient understood. Patient also mentioned he went to the pharmacy to  his Zepbound and needed a PA as per pharmacy. Patient given updates on his PA. No further assistants needed.

## 2025-05-14 NOTE — TELEPHONE ENCOUNTER
Patient's prior authorization denied for Zepbound. Per insurance, product is not covered under pharmacy benefit.     Please review and advise.     Patient may appeal but would have to fill out appropriate paperwork.

## 2025-05-16 NOTE — TELEPHONE ENCOUNTER
Attempted to reach patient's insurance at  to discuss alternate medication option. Contact number provided for callback.

## 2025-05-19 DIAGNOSIS — E11.00 TYPE 2 DIABETES MELLITUS WITH HYPEROSMOLARITY WITHOUT COMA, WITHOUT LONG-TERM CURRENT USE OF INSULIN (HCC): Primary | ICD-10-CM

## 2025-05-19 RX ORDER — TIRZEPATIDE 2.5 MG/.5ML
2.5 INJECTION, SOLUTION SUBCUTANEOUS WEEKLY
Qty: 2 ML | Refills: 0 | Status: SHIPPED | OUTPATIENT
Start: 2025-05-19

## 2025-05-19 NOTE — TELEPHONE ENCOUNTER
Prior authorization initiated on Cover my Meds for patient's Mounjaro 2.5 mg.     Pending review.     Key: [id number]

## 2025-05-19 NOTE — TELEPHONE ENCOUNTER
Spoke with Jessica, representative with patient's [employer] insurance at . Introduced self and role.  Discussed with Jessica, alternate option to Zepbound. Per Jessica, Ozempic and Mounjaro are on formulary. Patient would need to have a diagnosis of Type 2 Diabetes.     Please review and advise alternate options.

## 2025-05-20 ENCOUNTER — RA CDI HCC (OUTPATIENT)
Dept: OTHER | Facility: HOSPITAL | Age: 40
End: 2025-05-20

## 2025-05-20 DIAGNOSIS — E11.9 TYPE 2 DIABETES MELLITUS WITHOUT COMPLICATION, WITHOUT LONG-TERM CURRENT USE OF INSULIN (HCC): ICD-10-CM

## 2025-05-20 RX ORDER — SILDENAFIL 50 MG/1
50 TABLET, FILM COATED ORAL DAILY PRN
Qty: 10 TABLET | Refills: 3 | Status: SHIPPED | OUTPATIENT
Start: 2025-05-20

## 2025-05-20 NOTE — TELEPHONE ENCOUNTER
Patient called because he didn't understand why his prescription was denied. Requested I send a message for another refill and he wanted to speak with a clinical member.

## 2025-05-20 NOTE — TELEPHONE ENCOUNTER
Spoke with patient on the phone. Introduced self and role. Patient updated office is able to send refill of his Sildenafil to pharmacy. Medication was not denied. Patient just needs a new script sent to pharmacy.     Script sent to pharmacy for refill.     All questions/concerns answered at this time.

## 2025-05-20 NOTE — PROGRESS NOTES
Please review if this dx is applicable to the patient's condition and assess and document, if applicable in next visit        [id number]    HCC coding opportunities          Chart Reviewed number of suggestions sent to Provider: 1     Patients Insurance        Commercial Insurance: [employer]

## 2025-05-22 NOTE — TELEPHONE ENCOUNTER
Patient's prior authorization denied for Mounjaro. Insurance requesting copy of lab test confirming diagnosis of Type 2 diabetes, HgA1C greater than 6.5%    Faxed copy of patient's most recent HgA1C to [facility] at  for review. Patient's HgA1C 11.1.     Pending review.

## 2025-05-28 ENCOUNTER — TELEPHONE (OUTPATIENT)
Dept: INTERNAL MEDICINE CLINIC | Facility: CLINIC | Age: 40
End: 2025-05-28

## 2025-05-28 NOTE — TELEPHONE ENCOUNTER
----- Message from Cesar Muñoz DO sent at 5/28/2025  9:36 AM EDT -----  Regarding: Reschedule  Hello,He is a high risk patient who no showed today. Needs to reschedule appt asap.Thanks,Cesar

## 2025-05-28 NOTE — TELEPHONE ENCOUNTER
Contacted [facility] at . Spoke with Tammy. Patient's Mounjaro approved. Tammy did a test claim. Script processed. Per Tammy, requesting office to fax lab result to , prior authorization department.     Contacted patient's [facility] pharmacy. Introduced self and role to Eduar. Patient's Mounjaro script processed through insurance. Patient's copay cost is $988.35. Patient may have not reached his deductible.     Please review and advise.

## 2025-05-29 NOTE — TELEPHONE ENCOUNTER
Review patient's chart. Patient with history of uncontrolled diabetes. Patient's last HbA1c 11.1. Patient missed his appointment this past week. Patient was ordered to start Mounjaro, insurance does cover the medication but patient is unfortunately unable to meet his deductible. Patient is scheduled for appointment with 6/11 with Dr. Marquez. Patient will need to be seen before adjusting his insulin regimen. Thank you

## 2025-06-11 ENCOUNTER — OFFICE VISIT (OUTPATIENT)
Dept: INTERNAL MEDICINE CLINIC | Facility: CLINIC | Age: 40
End: 2025-06-11

## 2025-06-11 VITALS
DIASTOLIC BLOOD PRESSURE: 85 MMHG | TEMPERATURE: 98 F | SYSTOLIC BLOOD PRESSURE: 179 MMHG | OXYGEN SATURATION: 98 % | BODY MASS INDEX: 41.03 KG/M2 | WEIGHT: 311 LBS | HEART RATE: 62 BPM

## 2025-06-11 DIAGNOSIS — R74.8 ELEVATED LIVER ENZYMES: ICD-10-CM

## 2025-06-11 DIAGNOSIS — E11.9 TYPE 2 DIABETES MELLITUS WITHOUT COMPLICATION, WITHOUT LONG-TERM CURRENT USE OF INSULIN (HCC): Primary | ICD-10-CM

## 2025-06-11 DIAGNOSIS — R06.83 SNORING: ICD-10-CM

## 2025-06-11 DIAGNOSIS — I10 PRIMARY HYPERTENSION: ICD-10-CM

## 2025-06-11 PROCEDURE — 99214 OFFICE O/P EST MOD 30 MIN: CPT | Performed by: INTERNAL MEDICINE

## 2025-06-11 RX ORDER — INSULIN DEGLUDEC 100 U/ML
65 INJECTION, SOLUTION SUBCUTANEOUS
Qty: 19.5 ML | Refills: 1 | Status: SHIPPED | OUTPATIENT
Start: 2025-06-11

## 2025-06-11 NOTE — PROGRESS NOTES
Name: Rex Bradshaw      : 1985      MRN: [id number]  Encounter Provider: Sravan Marquez MD  Encounter Date: 2025   Encounter department: [facility] [place]  :  Assessment & Plan  Type 2 diabetes mellitus without complication, without long-term current use of insulin ([facility])    Lab Results   Component Value Date    HGBA1C 11.1 (A) 2025     Patient has a history of DM2 on insulin therapy. His most recent A1C was 11.1 last month. He was previously prescribed 50U degludec, metforming 1g BID, and Wegovy 2.5 weekly. He states that he has been compliant degludec 50U nightly, however states that he takes 30U extra in the mornings every other day. He states that he stopped taking his metformin 1 week ago due to nausea and vomiting. He states that he has not been taking the Wegovy due to high price. He does not check his blood sugars. He was previously ordered CGM, however states that he could not afford this either. Previously ordered bloodwork for RJ, however it has not been completed.    Plan  Increase degludec to 65U nightly  Discontinue metformin  Patient given information for obtaining glucometer at [facility] for affordable price  Recommend patient measure fasting glucose and 2-hour post-prandial glucose daily and keep log to bring to next visit  Diabetes education referral ordered  Complete bloodwork for RJ  Return to clinic in 3 weeks to go over log and determine appropriate therapy    Orders:    Ambulatory Referral to Diabetic Education; Future    Ambulatory Referral to Financial Counseling Program; Future    insulin degludec (Tresiba FlexTouch) 100 units/mL injection pen; Inject 65 Units under the skin daily at bedtime    Snoring  Patient endorsing snoring while sleeping. Neck circumference elevated and obese. High risk for sleep apnea. Referred to sleep medicine in past but hasn't seen them.    Recommend patient follow with sleep medicine for sleep study        Primary hypertension  Patient w/ history of HTN. BP elevated at office today 179/85. Previously prescribed lisinopril 20mg, however patient non-compliant w/ medication.    Plan  Start taking lisinopril  Return to clinic in 3 weeks for follow-up       Elevated liver enzymes  Suspect likely 2/2 to hepatic steatosis iso obesity and poorly-controlled diabetes    Recommend completing prior labwork              History of Present Illness   Patient is a 40 y/o M w/ PMH obesity, HTN, DM on insulin therapy, and elevated LFTs here for follow up of diabetes.    Patient has a history of DM2 on insulin therapy. His most recent A1C was 11.1 last month. He was previously prescribed 50U degludec, metforming 1g BID, and Wegovy 2.5 weekly. He states that he has been compliant degludec 50U nightly, however states that he takes 30U extra in the mornings every other day. He states that he stopped taking his metformin 1 week ago due to nausea and vomiting. He states that he has not been taking the Wegovy due to high price. He does not check his blood sugars. He was previously ordered CGM, however states that he could not afford this either. Previously ordered bloodwork for RJ, however it has not been completed.    Patient w/ history of snoring during sleep and previously referred to sleep medicine for sleep study, however endorses that he has not seen them.     Patient w/ previous labwork showing elevated LFTs. Suspect likely hepatic steatosis iso obesity and uncontrolled DM however w/u ordered and not completed    Patient w/ history of Htn. Current /85 today. Previously prescribed lisinopril 20mg, however patient endorses never taking this medication.      Review of Systems   Constitutional:  Negative for chills, fatigue and fever.   Respiratory:  Negative for chest tightness and shortness of breath.    Cardiovascular:  Negative for chest pain and palpitations.   Gastrointestinal:  Positive for nausea and vomiting. Negative  for abdominal pain and diarrhea.   Genitourinary:  Negative for difficulty urinating.   Skin:  Negative for rash.   Neurological:  Negative for light-headedness and headaches.       Objective   BP (!) 179/85 (BP Location: Left arm, Patient Position: Sitting, Cuff Size: Large)   Pulse 62   Temp 98 °F (36.7 °C) (Temporal)   Wt (!) 141 kg (311 lb)   SpO2 98%   BMI 41.03 kg/m²      Physical Exam  Vitals and nursing note reviewed.   Constitutional:       General: He is not in acute distress.     Appearance: Normal appearance. He is well-developed. He is obese.   HENT:      Head: Normocephalic and atraumatic.      Nose: Nose normal.      Mouth/Throat:      Mouth: Mucous membranes are moist.     Eyes:      Extraocular Movements: Extraocular movements intact.      Conjunctiva/sclera: Conjunctivae normal.      Pupils: Pupils are equal, round, and reactive to light.       Cardiovascular:      Rate and Rhythm: Normal rate and regular rhythm.      Pulses: Normal pulses.      Heart sounds: Normal heart sounds. No murmur heard.     No friction rub. No gallop.   Pulmonary:      Effort: Pulmonary effort is normal. No respiratory distress.      Breath sounds: Normal breath sounds. No wheezing, rhonchi or rales.   Abdominal:      General: Abdomen is flat. Bowel sounds are normal. There is no distension.      Palpations: Abdomen is soft.      Tenderness: There is no abdominal tenderness. There is no guarding.      Hernia: No hernia is present.     Musculoskeletal:         General: No swelling.      Cervical back: Neck supple.      Right lower leg: No edema.      Left lower leg: No edema.     Skin:     General: Skin is warm and dry.      Capillary Refill: Capillary refill takes less than 2 seconds.     Neurological:      General: No focal deficit present.      Mental Status: He is alert and oriented to person, place, and time. Mental status is at baseline.     Psychiatric:         Mood and Affect: Mood normal.           Sravan  Shawn Lopez PGY-1  Internal Medicine

## 2025-06-11 NOTE — PATIENT INSTRUCTIONS
We are going to increase the dose of Tresiba to 65U every night. Please do not take extra doses of the insulin.    We are going to discontinue metformin.    I am giving you information on a glucometer that you can get at [facility]. Please purchase this and test strips. I would like you to check your blood sugar every morning first thing when you wake up and then at night 2 hours after dinner. Write this down on a piece of paper and bring it to your next appointment.     I am going to refer you to meet with a diabetes educator.     Start taking the lisinopril for your blood pressure. We will re-check at your next appointment.    Please schedule an appointment with the sleep medicine doctors    Please get bloodwork done today or before your next appointment.    We will follow-up with you in 3 weeks

## 2025-06-12 NOTE — ASSESSMENT & PLAN NOTE
Patient w/ history of HTN. BP elevated at office today 179/85. Previously prescribed lisinopril 20mg, however patient non-compliant w/ medication.    Plan  Start taking lisinopril  Return to clinic in 3 weeks for follow-up

## 2025-06-12 NOTE — ASSESSMENT & PLAN NOTE
Lab Results   Component Value Date    HGBA1C 11.1 (A) 05/07/2025     Patient has a history of DM2 on insulin therapy. His most recent A1C was 11.1 last month. He was previously prescribed 50U degludec, metforming 1g BID, and Wegovy 2.5 weekly. He states that he has been compliant degludec 50U nightly, however states that he takes 30U extra in the mornings every other day. He states that he stopped taking his metformin 1 week ago due to nausea and vomiting. He states that he has not been taking the Wegovy due to high price. He does not check his blood sugars. He was previously ordered CGM, however states that he could not afford this either. Previously ordered bloodwork for RJ, however it has not been completed.    Plan  Increase degludec to 65U nightly  Discontinue metformin  Patient given information for obtaining glucometer at [facility] price  Recommend patient measure fasting glucose and 2-hour post-prandial glucose daily and keep log to bring to next visit  Diabetes education referral ordered  Complete bloodwork for RJ  Return to clinic in 3 weeks to go over log and determine appropriate therapy    Orders:    Ambulatory Referral to Diabetic Education; Future    Ambulatory Referral to Financial Counseling Program; Future    insulin degludec (Tresiba FlexTouch) 100 units/mL injection pen; Inject 65 Units under the skin daily at bedtime

## 2025-07-29 DIAGNOSIS — E11.9 TYPE 2 DIABETES MELLITUS WITHOUT COMPLICATION, WITHOUT LONG-TERM CURRENT USE OF INSULIN (HCC): ICD-10-CM

## 2025-07-29 RX ORDER — SILDENAFIL 50 MG/1
50 TABLET, FILM COATED ORAL DAILY PRN
Qty: 10 TABLET | Refills: 3 | Status: SHIPPED | OUTPATIENT
Start: 2025-07-29

## 2025-07-30 ENCOUNTER — OCCMED (OUTPATIENT)
Dept: URGENT CARE | Age: 40
End: 2025-07-30
Payer: OTHER MISCELLANEOUS

## 2025-07-30 ENCOUNTER — APPOINTMENT (OUTPATIENT)
Dept: RADIOLOGY | Age: 40
End: 2025-07-30
Payer: COMMERCIAL

## 2025-07-30 DIAGNOSIS — M79.671 RIGHT FOOT PAIN: ICD-10-CM

## 2025-07-30 DIAGNOSIS — M25.571 ACUTE RIGHT ANKLE PAIN: ICD-10-CM

## 2025-07-30 DIAGNOSIS — M25.571 ACUTE RIGHT ANKLE PAIN: Primary | ICD-10-CM

## 2025-07-30 PROCEDURE — G0382 LEV 3 HOSP TYPE B ED VISIT: HCPCS | Performed by: STUDENT IN AN ORGANIZED HEALTH CARE EDUCATION/TRAINING PROGRAM

## 2025-07-30 PROCEDURE — 73630 X-RAY EXAM OF FOOT: CPT

## 2025-07-30 PROCEDURE — 99283 EMERGENCY DEPT VISIT LOW MDM: CPT | Performed by: STUDENT IN AN ORGANIZED HEALTH CARE EDUCATION/TRAINING PROGRAM

## 2025-07-30 PROCEDURE — 73610 X-RAY EXAM OF ANKLE: CPT

## 2025-08-12 ENCOUNTER — APPOINTMENT (OUTPATIENT)
Dept: LAB | Facility: CLINIC | Age: 40
End: 2025-08-12
Payer: COMMERCIAL

## 2025-08-13 ENCOUNTER — TELEPHONE (OUTPATIENT)
Dept: INTERNAL MEDICINE CLINIC | Facility: CLINIC | Age: 40
End: 2025-08-13

## 2025-08-14 ENCOUNTER — OFFICE VISIT (OUTPATIENT)
Dept: INTERNAL MEDICINE CLINIC | Facility: CLINIC | Age: 40
End: 2025-08-14

## 2025-08-15 ENCOUNTER — TELEPHONE (OUTPATIENT)
Dept: INTERNAL MEDICINE CLINIC | Facility: CLINIC | Age: 40
End: 2025-08-15